# Patient Record
Sex: FEMALE | Race: BLACK OR AFRICAN AMERICAN | NOT HISPANIC OR LATINO
[De-identification: names, ages, dates, MRNs, and addresses within clinical notes are randomized per-mention and may not be internally consistent; named-entity substitution may affect disease eponyms.]

---

## 2017-04-14 PROBLEM — Z00.00 ENCOUNTER FOR PREVENTIVE HEALTH EXAMINATION: Status: ACTIVE | Noted: 2017-04-14

## 2017-04-17 ENCOUNTER — APPOINTMENT (OUTPATIENT)
Dept: OBGYN | Facility: CLINIC | Age: 57
End: 2017-04-17

## 2017-04-19 ENCOUNTER — OUTPATIENT (OUTPATIENT)
Dept: OUTPATIENT SERVICES | Facility: HOSPITAL | Age: 57
LOS: 1 days | Discharge: HOME | End: 2017-04-19

## 2017-05-15 ENCOUNTER — APPOINTMENT (OUTPATIENT)
Dept: OBGYN | Facility: CLINIC | Age: 57
End: 2017-05-15

## 2017-06-27 DIAGNOSIS — Z12.31 ENCOUNTER FOR SCREENING MAMMOGRAM FOR MALIGNANT NEOPLASM OF BREAST: ICD-10-CM

## 2017-09-11 ENCOUNTER — APPOINTMENT (OUTPATIENT)
Dept: OBGYN | Facility: CLINIC | Age: 57
End: 2017-09-11

## 2017-09-11 ENCOUNTER — RESULT CHARGE (OUTPATIENT)
Age: 57
End: 2017-09-11

## 2017-09-11 ENCOUNTER — OUTPATIENT (OUTPATIENT)
Dept: EMERGENCY DEPT | Facility: HOSPITAL | Age: 57
LOS: 1 days | Discharge: HOME | End: 2017-09-11

## 2017-09-11 VITALS
DIASTOLIC BLOOD PRESSURE: 60 MMHG | BODY MASS INDEX: 19.1 KG/M2 | WEIGHT: 126 LBS | SYSTOLIC BLOOD PRESSURE: 110 MMHG | HEIGHT: 68 IN

## 2017-09-11 DIAGNOSIS — F93.0 SEPARATION ANXIETY DISORDER OF CHILDHOOD: ICD-10-CM

## 2017-09-11 DIAGNOSIS — R87.619 UNSPECIFIED ABNORMAL CYTOLOGICAL FINDINGS IN SPECIMENS FROM CERVIX UTERI: ICD-10-CM

## 2017-09-11 DIAGNOSIS — A81.2 PROGRESSIVE MULTIFOCAL LEUKOENCEPHALOPATHY: ICD-10-CM

## 2017-09-11 DIAGNOSIS — B20 HUMAN IMMUNODEFICIENCY VIRUS [HIV] DISEASE: ICD-10-CM

## 2017-09-11 DIAGNOSIS — Z20.2 CONTACT WITH AND (SUSPECTED) EXPOSURE TO INFECTIONS WITH A PREDOMINANTLY SEXUAL MODE OF TRANSMISSION: ICD-10-CM

## 2017-09-11 DIAGNOSIS — B59 PNEUMOCYSTOSIS: ICD-10-CM

## 2017-09-11 LAB
HCG UR QL: NEGATIVE
QUALITY CONTROL: NO

## 2017-09-12 ENCOUNTER — RESULT REVIEW (OUTPATIENT)
Age: 57
End: 2017-09-12

## 2017-09-19 LAB
HPV I/H RISK 1 DNA CVX QL PROBE+SIG AMP: DETECTED
HPV16 DNA CVX QL PROBE+SIG AMP: DETECTED
HPV16 DNA CVX QL PROBE+SIG AMP: NOT DETECTED

## 2017-10-02 ENCOUNTER — APPOINTMENT (OUTPATIENT)
Dept: OBGYN | Facility: CLINIC | Age: 57
End: 2017-10-02

## 2017-11-20 ENCOUNTER — OUTPATIENT (OUTPATIENT)
Dept: OUTPATIENT SERVICES | Facility: HOSPITAL | Age: 57
LOS: 1 days | Discharge: HOME | End: 2017-11-20

## 2017-11-20 ENCOUNTER — APPOINTMENT (OUTPATIENT)
Dept: CARDIOLOGY | Facility: CLINIC | Age: 57
End: 2017-11-20

## 2017-11-20 VITALS
WEIGHT: 130 LBS | SYSTOLIC BLOOD PRESSURE: 139 MMHG | HEIGHT: 68 IN | DIASTOLIC BLOOD PRESSURE: 76 MMHG | BODY MASS INDEX: 19.7 KG/M2 | HEART RATE: 83 BPM

## 2017-11-20 DIAGNOSIS — B20 HUMAN IMMUNODEFICIENCY VIRUS [HIV] DISEASE: ICD-10-CM

## 2017-11-20 DIAGNOSIS — A81.2 PROGRESSIVE MULTIFOCAL LEUKOENCEPHALOPATHY: ICD-10-CM

## 2017-11-20 DIAGNOSIS — F93.0 SEPARATION ANXIETY DISORDER OF CHILDHOOD: ICD-10-CM

## 2017-11-20 DIAGNOSIS — B59 PNEUMOCYSTOSIS: ICD-10-CM

## 2017-11-20 DIAGNOSIS — R07.9 CHEST PAIN, UNSPECIFIED: ICD-10-CM

## 2018-01-08 ENCOUNTER — OUTPATIENT (OUTPATIENT)
Dept: OUTPATIENT SERVICES | Facility: HOSPITAL | Age: 58
LOS: 1 days | Discharge: HOME | End: 2018-01-08

## 2018-01-08 ENCOUNTER — APPOINTMENT (OUTPATIENT)
Dept: OBGYN | Facility: CLINIC | Age: 58
End: 2018-01-08

## 2018-01-08 ENCOUNTER — RESULT CHARGE (OUTPATIENT)
Age: 58
End: 2018-01-08

## 2018-01-08 VITALS
DIASTOLIC BLOOD PRESSURE: 60 MMHG | HEIGHT: 68 IN | BODY MASS INDEX: 19.7 KG/M2 | SYSTOLIC BLOOD PRESSURE: 130 MMHG | WEIGHT: 130 LBS

## 2018-01-08 DIAGNOSIS — F93.0 SEPARATION ANXIETY DISORDER OF CHILDHOOD: ICD-10-CM

## 2018-01-08 DIAGNOSIS — A81.2 PROGRESSIVE MULTIFOCAL LEUKOENCEPHALOPATHY: ICD-10-CM

## 2018-01-08 DIAGNOSIS — B59 PNEUMOCYSTOSIS: ICD-10-CM

## 2018-01-08 DIAGNOSIS — B20 HUMAN IMMUNODEFICIENCY VIRUS [HIV] DISEASE: ICD-10-CM

## 2018-01-08 LAB
HCG UR QL: NEGATIVE
QUALITY CONTROL: YES

## 2018-01-09 ENCOUNTER — RESULT REVIEW (OUTPATIENT)
Age: 58
End: 2018-01-09

## 2018-01-22 ENCOUNTER — APPOINTMENT (OUTPATIENT)
Dept: OBGYN | Facility: CLINIC | Age: 58
End: 2018-01-22

## 2018-01-22 ENCOUNTER — OUTPATIENT (OUTPATIENT)
Dept: OUTPATIENT SERVICES | Facility: HOSPITAL | Age: 58
LOS: 1 days | Discharge: HOME | End: 2018-01-22

## 2018-01-22 DIAGNOSIS — N76.0 ACUTE VAGINITIS: ICD-10-CM

## 2018-01-22 DIAGNOSIS — B96.89 ACUTE VAGINITIS: ICD-10-CM

## 2018-02-04 DIAGNOSIS — F93.0 SEPARATION ANXIETY DISORDER OF CHILDHOOD: ICD-10-CM

## 2018-02-04 DIAGNOSIS — B20 HUMAN IMMUNODEFICIENCY VIRUS [HIV] DISEASE: ICD-10-CM

## 2018-02-04 DIAGNOSIS — B59 PNEUMOCYSTOSIS: ICD-10-CM

## 2018-02-04 DIAGNOSIS — A81.2 PROGRESSIVE MULTIFOCAL LEUKOENCEPHALOPATHY: ICD-10-CM

## 2018-02-26 ENCOUNTER — EMERGENCY (EMERGENCY)
Facility: HOSPITAL | Age: 58
LOS: 0 days | Discharge: HOME | End: 2018-02-26
Attending: EMERGENCY MEDICINE

## 2018-02-26 VITALS
HEART RATE: 72 BPM | RESPIRATION RATE: 18 BRPM | TEMPERATURE: 98 F | OXYGEN SATURATION: 99 % | DIASTOLIC BLOOD PRESSURE: 63 MMHG | SYSTOLIC BLOOD PRESSURE: 130 MMHG

## 2018-02-26 DIAGNOSIS — Z86.73 PERSONAL HISTORY OF TRANSIENT ISCHEMIC ATTACK (TIA), AND CEREBRAL INFARCTION WITHOUT RESIDUAL DEFICITS: ICD-10-CM

## 2018-02-26 DIAGNOSIS — Z21 ASYMPTOMATIC HUMAN IMMUNODEFICIENCY VIRUS [HIV] INFECTION STATUS: ICD-10-CM

## 2018-02-26 DIAGNOSIS — R19.7 DIARRHEA, UNSPECIFIED: ICD-10-CM

## 2018-02-26 DIAGNOSIS — R19.15 OTHER ABNORMAL BOWEL SOUNDS: ICD-10-CM

## 2018-02-26 DIAGNOSIS — R10.9 UNSPECIFIED ABDOMINAL PAIN: ICD-10-CM

## 2018-02-26 NOTE — ED PROVIDER NOTE - NS ED ROS FT
Constitutional: No fever, chills.  Eyes: No visual changes, eye pain or discharge.  ENMT: No hearing changes, pain, discharge or infections.  Cardiac: No chest pain, SOB or edema.  Respiratory: No cough or respiratory distress.   : No dysuria, frequency or burning.  MS: No myalgia, muscle weakness, joint pain or back pain.  Neuro: No headache or weakness. No LOC.  Skin: No skin rash.   Except as documented in the HPI, all other systems are negative.

## 2018-02-26 NOTE — ED PROVIDER NOTE - PHYSICAL EXAMINATION
VSS, awake, alert, non-toxic appearing, lying comfortably in stretcher, in NAD, ears clear, no scleral icterus, oropharynx clear, mmm, no JVD or bruit, no jaundice, skin rash or lesions, chest CTAB, non-labored breathing, no w/r/r, +S1/S2, RRR, no m/r/g, abdomen soft, NT, ND, +BS, no scars, hernias or distention, no pulsatile masses or bruits appreciated, heme neg brown stool, no CVA tenderness, no peripheral edema or deformities, alert, clear speech and steady gait. VSS, awake, alert, non-toxic appearing, lying comfortably in stretcher, in NAD, ears clear, no scleral icterus, oropharynx clear, mmm, no JVD or bruit, no jaundice, skin rash or lesions, chest CTAB, non-labored breathing, no w/r/r, +S1/S2, RRR, no m/r/g, abdomen soft, NT, ND, +BS, no scars, hernias or distention, no pulsatile masses or bruits appreciated, no CVA tenderness, no peripheral edema or deformities, alert, clear speech and steady gait.

## 2018-02-26 NOTE — ED PROVIDER NOTE - OBJECTIVE STATEMENT
56 y/o female with an pmhx of HIVD follows up with Dr. Kitchen, reports undetectable counts, also reports hx of prior CVA with residual right sided weakness presents with epigastric “bubbling” x 3 days. Symptoms are intermittent, denies radiation, denies modifying factors. Reports several episodes of NB diarrhea daily. Not recently hospitalized, not institutionalized or in day program. Denies blood in stool, black stool, unintentional weight loss, abdominal pain, fever, headache, myalgias, n/v, recent antibiotic use, recent travel or sick contacts n/v, anorexia, urinary sx, vaginal d/c or other associated complaints at present.

## 2018-02-26 NOTE — ED PROVIDER NOTE - PROGRESS NOTE DETAILS
The patient was advised to return to the emergency department if any new symptoms developed, symptoms worsened or for any concerns. The patient was offered the opportunity to ask questions and verbalized that they understand the diagnosis and discharge instructions. Impression: pt has non-infectious diarrhea, no indication of ABX at this time. Pt is well appearing, supportive care measure, outpatient f/u. The patient was advised to return to the emergency department if any new symptoms developed, symptoms worsened or for any concerns. The patient was offered the opportunity to ask questions and verbalized that they understand the diagnosis and discharge instructions. 3.709 3.65

## 2018-03-05 ENCOUNTER — OUTPATIENT (OUTPATIENT)
Dept: OUTPATIENT SERVICES | Facility: HOSPITAL | Age: 58
LOS: 1 days | Discharge: HOME | End: 2018-03-05

## 2018-03-05 ENCOUNTER — APPOINTMENT (OUTPATIENT)
Dept: OBGYN | Facility: CLINIC | Age: 58
End: 2018-03-05

## 2018-03-05 ENCOUNTER — RESULT CHARGE (OUTPATIENT)
Age: 58
End: 2018-03-05

## 2018-03-05 ENCOUNTER — LABORATORY RESULT (OUTPATIENT)
Age: 58
End: 2018-03-05

## 2018-03-05 DIAGNOSIS — Z98.890 OTHER SPECIFIED POSTPROCEDURAL STATES: ICD-10-CM

## 2018-03-05 DIAGNOSIS — N87.1 MODERATE CERVICAL DYSPLASIA: ICD-10-CM

## 2018-03-07 LAB
HCG UR QL: NEGATIVE
QUALITY CONTROL: YES

## 2018-03-14 ENCOUNTER — OTHER (OUTPATIENT)
Age: 58
End: 2018-03-14

## 2018-04-16 ENCOUNTER — APPOINTMENT (OUTPATIENT)
Dept: OBGYN | Facility: CLINIC | Age: 58
End: 2018-04-16

## 2018-04-20 ENCOUNTER — EMERGENCY (EMERGENCY)
Facility: HOSPITAL | Age: 58
LOS: 0 days | Discharge: HOME | End: 2018-04-20
Admitting: PHYSICIAN ASSISTANT

## 2018-04-20 VITALS
DIASTOLIC BLOOD PRESSURE: 63 MMHG | RESPIRATION RATE: 18 BRPM | TEMPERATURE: 97 F | SYSTOLIC BLOOD PRESSURE: 114 MMHG | OXYGEN SATURATION: 100 % | HEART RATE: 96 BPM

## 2018-04-20 DIAGNOSIS — Z86.73 PERSONAL HISTORY OF TRANSIENT ISCHEMIC ATTACK (TIA), AND CEREBRAL INFARCTION WITHOUT RESIDUAL DEFICITS: ICD-10-CM

## 2018-04-20 DIAGNOSIS — Z88.2 ALLERGY STATUS TO SULFONAMIDES: ICD-10-CM

## 2018-04-20 DIAGNOSIS — H10.45 OTHER CHRONIC ALLERGIC CONJUNCTIVITIS: ICD-10-CM

## 2018-04-20 DIAGNOSIS — H57.8 OTHER SPECIFIED DISORDERS OF EYE AND ADNEXA: ICD-10-CM

## 2018-04-20 RX ORDER — OLOPATADINE HYDROCHLORIDE 1 MG/ML
1 SOLUTION/ DROPS OPHTHALMIC
Qty: 5 | Refills: 0
Start: 2018-04-20 | End: 2018-04-26

## 2018-04-20 NOTE — ED PROVIDER NOTE - PHYSICAL EXAMINATION
CONST: Well appearing in NAD  EYES: PERRL, EOMI, Sclera and conjunctiva clear. No discharge. No swelling. VA 20/20 OU,OS, OD. IOP OS is 11   ENT: No nasal discharge. TM's clear B/L without drainage. Oropharynx normal appearing, no erythema or exudates. Uvula midline.  NECK: Non-tender, no meningeal signs  CARD: Normal S1 S2; Normal rate and rhythm  RESP: Equal BS B/L, No wheezes, rhonchi or rales. No distress  GI: Soft, non-tender, non-distended.  MS: Normal ROM in all extremities. No midline spinal tenderness.  SKIN: Warm, dry, no acute rashes. Good turgor  NEURO: A&Ox3, No focal deficits. Strength 5/5 with no sensory deficits. Steady gait. Speech normal. No facial droop

## 2018-04-20 NOTE — ED PROVIDER NOTE - NS ED ROS FT
CONST: No fever, chills or bodyaches  ENT: No ear pain or discharge, nasal discharge or congestion. No sore throat  CARD: No chest pain, palpitations  RESP: No SOB, cough, hemoptysis. No hx of asthma or COPD  GI: No abdominal pain, N/V/D  MS: No joint pain, back pain or extremity pain/injury  SKIN: No rashes  NEURO: No headache, dizziness, paresthesias or LOC, or weakness

## 2018-04-20 NOTE — ED PROVIDER NOTE - OBJECTIVE STATEMENT
itchy eyes bilaterally x 2 days assoc with redness. Denies discharge, vision change, eye pain, HA, NV

## 2018-04-23 ENCOUNTER — APPOINTMENT (OUTPATIENT)
Dept: OBGYN | Facility: CLINIC | Age: 58
End: 2018-04-23

## 2018-05-16 ENCOUNTER — APPOINTMENT (OUTPATIENT)
Dept: PAIN MANAGEMENT | Facility: CLINIC | Age: 58
End: 2018-05-16

## 2018-05-16 ENCOUNTER — OUTPATIENT (OUTPATIENT)
Dept: OUTPATIENT SERVICES | Facility: HOSPITAL | Age: 58
LOS: 1 days | Discharge: HOME | End: 2018-05-16

## 2018-05-16 VITALS
RESPIRATION RATE: 16 BRPM | HEART RATE: 77 BPM | SYSTOLIC BLOOD PRESSURE: 120 MMHG | OXYGEN SATURATION: 100 % | DIASTOLIC BLOOD PRESSURE: 72 MMHG | HEIGHT: 68 IN | WEIGHT: 130 LBS | BODY MASS INDEX: 19.7 KG/M2

## 2018-06-04 ENCOUNTER — APPOINTMENT (OUTPATIENT)
Dept: OBGYN | Facility: CLINIC | Age: 58
End: 2018-06-04

## 2018-07-05 ENCOUNTER — APPOINTMENT (OUTPATIENT)
Dept: OTOLARYNGOLOGY | Facility: CLINIC | Age: 58
End: 2018-07-05
Payer: MEDICAID

## 2018-07-05 VITALS — WEIGHT: 130 LBS | BODY MASS INDEX: 19.7 KG/M2 | HEIGHT: 68 IN

## 2018-07-05 DIAGNOSIS — H61.23 IMPACTED CERUMEN, BILATERAL: ICD-10-CM

## 2018-07-05 PROCEDURE — 69210 REMOVE IMPACTED EAR WAX UNI: CPT

## 2018-07-05 PROCEDURE — 99202 OFFICE O/P NEW SF 15 MIN: CPT | Mod: 25

## 2018-07-20 ENCOUNTER — APPOINTMENT (OUTPATIENT)
Dept: SURGERY | Facility: CLINIC | Age: 58
End: 2018-07-20

## 2018-08-24 ENCOUNTER — APPOINTMENT (OUTPATIENT)
Dept: GASTROENTEROLOGY | Facility: CLINIC | Age: 58
End: 2018-08-24

## 2019-08-01 ENCOUNTER — APPOINTMENT (OUTPATIENT)
Dept: SURGERY | Facility: CLINIC | Age: 59
End: 2019-08-01

## 2019-08-14 ENCOUNTER — OUTPATIENT (OUTPATIENT)
Dept: OUTPATIENT SERVICES | Facility: HOSPITAL | Age: 59
LOS: 1 days | Discharge: HOME | End: 2019-08-14

## 2019-08-14 DIAGNOSIS — Z21 ASYMPTOMATIC HUMAN IMMUNODEFICIENCY VIRUS [HIV] INFECTION STATUS: ICD-10-CM

## 2019-09-13 ENCOUNTER — APPOINTMENT (OUTPATIENT)
Dept: SURGERY | Facility: CLINIC | Age: 59
End: 2019-09-13
Payer: MEDICAID

## 2019-09-13 VITALS
SYSTOLIC BLOOD PRESSURE: 138 MMHG | DIASTOLIC BLOOD PRESSURE: 82 MMHG | HEIGHT: 68 IN | WEIGHT: 143 LBS | BODY MASS INDEX: 21.67 KG/M2

## 2019-09-13 DIAGNOSIS — Z86.73 PERSONAL HISTORY OF TRANSIENT ISCHEMIC ATTACK (TIA), AND CEREBRAL INFARCTION W/OUT RESIDUAL DEFICITS: ICD-10-CM

## 2019-09-13 DIAGNOSIS — Z82.49 FAMILY HISTORY OF ISCHEMIC HEART DISEASE AND OTHER DISEASES OF THE CIRCULATORY SYSTEM: ICD-10-CM

## 2019-09-13 PROCEDURE — 99212 OFFICE O/P EST SF 10 MIN: CPT

## 2019-09-16 PROBLEM — Z82.49 FAMILY HISTORY OF HYPERTENSION: Status: ACTIVE | Noted: 2017-09-11

## 2019-09-16 PROBLEM — Z86.73 HISTORY OF STROKE: Status: RESOLVED | Noted: 2017-09-11 | Resolved: 2019-09-16

## 2019-09-16 RX ORDER — METRONIDAZOLE 500 MG/1
500 TABLET ORAL
Qty: 10 | Refills: 0 | Status: COMPLETED | COMMUNITY
Start: 2018-01-22 | End: 2019-09-16

## 2019-09-17 NOTE — ASSESSMENT
[FreeTextEntry1] : 58yo female with mutliple medical problems including CVA and HIV referred for right shoulder mass - likely lipoma. \par -risks, benefits, and alteratives were explained to the patient - including risk of hematoma, seroma, deformity and infection\par -all questions were answered\par -consent was obtained for EXCISION OF RIGHT SHOULDER MASS\par -will require PAT's\par -plan for excision under local with sedation\par -return to office post-operatively\par

## 2019-09-17 NOTE — REASON FOR VISIT
[Consultation] : a consultation visit [Formal Caregiver] : formal caregiver [FreeTextEntry1] : right shoulder mass

## 2019-09-17 NOTE — PHYSICAL EXAM
[Normal Breath Sounds] : Normal breath sounds [No Rash or Lesion] : No rash or lesion [Normal Heart Sounds] : normal heart sounds [Calm] : calm [de-identified] : no acute distress [de-identified] : soft, no hernias, no masses [de-identified] : full ROM x 4, 4x4cm soft tissue mass right shoulder [de-identified] : no gross abnormalities

## 2019-09-17 NOTE — CONSULT LETTER
[Please see my note below.] : Please see my note below. [Sincerely,] : Sincerely, [Dear  ___] : Dear  [unfilled], [FreeTextEntry3] : Lindsey Crowder MD\par Bariatric & Minimally Invasive Surgery\par [facility]\par [phone]\par

## 2019-09-17 NOTE — HISTORY OF PRESENT ILLNESS
[de-identified] : 58yo female with PMHx of CVA 10 years ago, HIV (dx 32 years ago, pending CD4), HTN, chronic pain on Oxycodone referred for evaluation of right shoulder mass. Patient reports noticing this shoulder mass several years ago, but it has increased in size over time. Denies neurologic deficits, drainage, or changes in skin. It is uncomfortable as it is where her brastrap sits on her shoulder. She states that she has another "bump" on her left chest but it does not bother her.

## 2019-09-23 ENCOUNTER — APPOINTMENT (OUTPATIENT)
Dept: GASTROENTEROLOGY | Facility: CLINIC | Age: 59
End: 2019-09-23

## 2019-09-24 ENCOUNTER — OUTPATIENT (OUTPATIENT)
Dept: OUTPATIENT SERVICES | Facility: HOSPITAL | Age: 59
LOS: 1 days | Discharge: HOME | End: 2019-09-24
Payer: COMMERCIAL

## 2019-09-24 DIAGNOSIS — M25.511 PAIN IN RIGHT SHOULDER: ICD-10-CM

## 2019-09-24 DIAGNOSIS — M25.551 PAIN IN RIGHT HIP: ICD-10-CM

## 2019-09-24 PROCEDURE — 73522 X-RAY EXAM HIPS BI 3-4 VIEWS: CPT | Mod: 26

## 2019-09-24 PROCEDURE — 73030 X-RAY EXAM OF SHOULDER: CPT | Mod: 26,50

## 2019-10-30 ENCOUNTER — APPOINTMENT (OUTPATIENT)
Dept: SURGERY | Facility: CLINIC | Age: 59
End: 2019-10-30
Payer: COMMERCIAL

## 2019-10-30 VITALS
HEIGHT: 68 IN | SYSTOLIC BLOOD PRESSURE: 138 MMHG | DIASTOLIC BLOOD PRESSURE: 82 MMHG | WEIGHT: 141 LBS | BODY MASS INDEX: 21.37 KG/M2

## 2019-10-30 PROCEDURE — 99212 OFFICE O/P EST SF 10 MIN: CPT

## 2019-10-31 NOTE — PHYSICAL EXAM
[Normal Breath Sounds] : Normal breath sounds [Normal Heart Sounds] : normal heart sounds [No Rash or Lesion] : No rash or lesion [Calm] : calm [de-identified] : no acute distress [de-identified] : soft, no hernias, no masses [de-identified] : full ROM x 4, [id number] soft tissue mass right shoulder [de-identified] : no gross abnormalities

## 2019-10-31 NOTE — ASSESSMENT
[FreeTextEntry1] : 60yo female with mutliple medical problems including CVA and HIV referred for right shoulder mass - likely lipoma. \par -risks, benefits, and alteratives were explained to the patient - including risk of hematoma, seroma, scar, deformity and infection\par -all questions were answered\par -consent was obtained at last visit for EXCISION OF RIGHT SHOULDER MASS\par -cleared by PCP as per patient - will contact PCP for clearances\par -plan for excision under local with sedation\par -return to office post-operatively

## 2019-10-31 NOTE — HISTORY OF PRESENT ILLNESS
[de-identified] : 60yo female with PMHx of CVA 10 years ago, HIV (dx 32 years ago, pending CD4), HTN, chronic pain on Oxycodone previously seen in office for right shoulder mass evaluation. Patient reports that the mass continues to bother her. No new symptoms or change in lesion. She was seen by her PCP and obtained clearance for surgery and feels ready to schedule her operation.

## 2019-11-10 ENCOUNTER — FORM ENCOUNTER (OUTPATIENT)
Age: 59
End: 2019-11-10

## 2019-11-11 ENCOUNTER — OUTPATIENT (OUTPATIENT)
Dept: OUTPATIENT SERVICES | Facility: HOSPITAL | Age: 59
LOS: 1 days | Discharge: HOME | End: 2019-11-11
Payer: COMMERCIAL

## 2019-11-11 VITALS
HEIGHT: 66 IN | DIASTOLIC BLOOD PRESSURE: 78 MMHG | TEMPERATURE: 97 F | OXYGEN SATURATION: 99 % | HEART RATE: 69 BPM | RESPIRATION RATE: 17 BRPM | WEIGHT: 143.3 LBS | SYSTOLIC BLOOD PRESSURE: 130 MMHG

## 2019-11-11 DIAGNOSIS — R22.30 LOCALIZED SWELLING, MASS AND LUMP, UNSPECIFIED UPPER LIMB: ICD-10-CM

## 2019-11-11 DIAGNOSIS — Z01.818 ENCOUNTER FOR OTHER PREPROCEDURAL EXAMINATION: ICD-10-CM

## 2019-11-11 PROCEDURE — 93010 ELECTROCARDIOGRAM REPORT: CPT

## 2019-11-11 PROCEDURE — 71046 X-RAY EXAM CHEST 2 VIEWS: CPT | Mod: 26

## 2019-11-11 NOTE — H&P PST ADULT - REASON FOR ADMISSION
58 Y/O FEMALE HERE FOR PRE-ADMISSION SURGICAL TESTING. PATIENT REPORTS SHE HAS A MASS TO RIGHT SHOULDER FOR SEVERAL YRS, GETTING BIGGER AND CAUSING PAIN.  NOW FOR SCHEDULED PROCEDURE.

## 2019-11-11 NOTE — H&P PST ADULT - NSICDXPASTMEDICALHX_GEN_ALL_CORE_FT
PAST MEDICAL HISTORY:  COPD (chronic obstructive pulmonary disease)     CVA (cerebral vascular accident) 1997 RIGHT SIDED WEAKNESS    HIV (human immunodeficiency virus infection) DX 1997

## 2019-11-11 NOTE — H&P PST ADULT - NSANTHOSAYNRD_GEN_A_CORE
No. MIA screening performed.  STOP BANG Legend: 0-2 = LOW Risk; 3-4 = INTERMEDIATE Risk; 5-8 = HIGH Risk

## 2019-11-11 NOTE — H&P PST ADULT - HISTORY OF PRESENT ILLNESS
PATIENT DENIES CHEST PAIN, SHORTNESS OF BREATH, PALPITATIONS, COUGHING, FEVER, DYSURIA.  CAN WALK UP 2-3 FLIGHTS OF STEPS WITHOUT SOB.    **PATIENT HAS BLISTERY RASH NOTED TO RIGHT SIDE OF ABDOMEN ALONG DERMATONE LEVEL, CAUSING PAIN AND ITCHINESS.

## 2020-01-13 PROBLEM — J44.9 CHRONIC OBSTRUCTIVE PULMONARY DISEASE, UNSPECIFIED: Chronic | Status: ACTIVE | Noted: 2019-11-11

## 2020-01-13 PROBLEM — I63.9 CEREBRAL INFARCTION, UNSPECIFIED: Chronic | Status: ACTIVE | Noted: 2018-02-26

## 2020-01-20 ENCOUNTER — OUTPATIENT (OUTPATIENT)
Dept: OUTPATIENT SERVICES | Facility: HOSPITAL | Age: 60
LOS: 1 days | Discharge: HOME | End: 2020-01-20

## 2020-01-20 VITALS
HEIGHT: 65 IN | TEMPERATURE: 99 F | OXYGEN SATURATION: 100 % | WEIGHT: 138.89 LBS | RESPIRATION RATE: 16 BRPM | HEART RATE: 80 BPM | SYSTOLIC BLOOD PRESSURE: 147 MMHG | DIASTOLIC BLOOD PRESSURE: 75 MMHG

## 2020-01-20 DIAGNOSIS — Z01.818 ENCOUNTER FOR OTHER PREPROCEDURAL EXAMINATION: ICD-10-CM

## 2020-01-20 DIAGNOSIS — R22.30 LOCALIZED SWELLING, MASS AND LUMP, UNSPECIFIED UPPER LIMB: ICD-10-CM

## 2020-01-20 LAB
ALBUMIN SERPL ELPH-MCNC: 4.2 G/DL — SIGNIFICANT CHANGE UP (ref 3.5–5.2)
ALP SERPL-CCNC: 158 U/L — HIGH (ref 30–115)
ALT FLD-CCNC: 12 U/L — SIGNIFICANT CHANGE UP (ref 0–41)
ANION GAP SERPL CALC-SCNC: 13 MMOL/L — SIGNIFICANT CHANGE UP (ref 7–14)
APTT BLD: 34.7 SEC — SIGNIFICANT CHANGE UP (ref 27–39.2)
AST SERPL-CCNC: 24 U/L — SIGNIFICANT CHANGE UP (ref 0–41)
BASOPHILS # BLD AUTO: 0.01 K/UL — SIGNIFICANT CHANGE UP (ref 0–0.2)
BASOPHILS NFR BLD AUTO: 0.4 % — SIGNIFICANT CHANGE UP (ref 0–1)
BILIRUB SERPL-MCNC: 0.3 MG/DL — SIGNIFICANT CHANGE UP (ref 0.2–1.2)
BUN SERPL-MCNC: 11 MG/DL — SIGNIFICANT CHANGE UP (ref 10–20)
CALCIUM SERPL-MCNC: 9 MG/DL — SIGNIFICANT CHANGE UP (ref 8.5–10.1)
CHLORIDE SERPL-SCNC: 105 MMOL/L — SIGNIFICANT CHANGE UP (ref 98–110)
CO2 SERPL-SCNC: 23 MMOL/L — SIGNIFICANT CHANGE UP (ref 17–32)
CREAT SERPL-MCNC: 1 MG/DL — SIGNIFICANT CHANGE UP (ref 0.7–1.5)
EOSINOPHIL # BLD AUTO: 0.01 K/UL — SIGNIFICANT CHANGE UP (ref 0–0.7)
EOSINOPHIL NFR BLD AUTO: 0.4 % — SIGNIFICANT CHANGE UP (ref 0–8)
GLUCOSE SERPL-MCNC: 126 MG/DL — HIGH (ref 70–99)
HCT VFR BLD CALC: 40.4 % — SIGNIFICANT CHANGE UP (ref 37–47)
HGB BLD-MCNC: 13 G/DL — SIGNIFICANT CHANGE UP (ref 12–16)
IMM GRANULOCYTES NFR BLD AUTO: 0.4 % — HIGH (ref 0.1–0.3)
INR BLD: 1.03 RATIO — SIGNIFICANT CHANGE UP (ref 0.65–1.3)
LYMPHOCYTES # BLD AUTO: 1.1 K/UL — LOW (ref 1.2–3.4)
LYMPHOCYTES # BLD AUTO: 39 % — SIGNIFICANT CHANGE UP (ref 20.5–51.1)
MCHC RBC-ENTMCNC: 30.6 PG — SIGNIFICANT CHANGE UP (ref 27–31)
MCHC RBC-ENTMCNC: 32.2 G/DL — SIGNIFICANT CHANGE UP (ref 32–37)
MCV RBC AUTO: 95.1 FL — SIGNIFICANT CHANGE UP (ref 81–99)
MONOCYTES # BLD AUTO: 0.29 K/UL — SIGNIFICANT CHANGE UP (ref 0.1–0.6)
MONOCYTES NFR BLD AUTO: 10.3 % — HIGH (ref 1.7–9.3)
NEUTROPHILS # BLD AUTO: 1.4 K/UL — SIGNIFICANT CHANGE UP (ref 1.4–6.5)
NEUTROPHILS NFR BLD AUTO: 49.5 % — SIGNIFICANT CHANGE UP (ref 42.2–75.2)
NRBC # BLD: 0 /100 WBCS — SIGNIFICANT CHANGE UP (ref 0–0)
PLATELET # BLD AUTO: 184 K/UL — SIGNIFICANT CHANGE UP (ref 130–400)
POTASSIUM SERPL-MCNC: 4.6 MMOL/L — SIGNIFICANT CHANGE UP (ref 3.5–5)
POTASSIUM SERPL-SCNC: 4.6 MMOL/L — SIGNIFICANT CHANGE UP (ref 3.5–5)
PROT SERPL-MCNC: 7.6 G/DL — SIGNIFICANT CHANGE UP (ref 6–8)
PROTHROM AB SERPL-ACNC: 11.8 SEC — SIGNIFICANT CHANGE UP (ref 9.95–12.87)
RBC # BLD: 4.25 M/UL — SIGNIFICANT CHANGE UP (ref 4.2–5.4)
RBC # FLD: 14.3 % — SIGNIFICANT CHANGE UP (ref 11.5–14.5)
SODIUM SERPL-SCNC: 141 MMOL/L — SIGNIFICANT CHANGE UP (ref 135–146)
WBC # BLD: 2.82 K/UL — LOW (ref 4.8–10.8)
WBC # FLD AUTO: 2.82 K/UL — LOW (ref 4.8–10.8)

## 2020-01-20 NOTE — H&P PST ADULT - REASON FOR ADMISSION
58 yo female presents with  "cyst on my right shoulder& I have to get it removed, it is getting bigger";  denies chest pain, palpitations, shortness of breath, dyspnea, or dysuria. exercise tolerance: 2 blocks/ flights of stairs w/o sob

## 2020-01-20 NOTE — H&P PST ADULT - NSICDXPASTMEDICALHX_GEN_ALL_CORE_FT
PAST MEDICAL HISTORY:  COPD (chronic obstructive pulmonary disease)     CVA (cerebral vascular accident) 1997 RIGHT SIDED WEAKNESS    HIV (human immunodeficiency virus infection) DX 1997    HTN (hypertension)

## 2020-01-27 PROBLEM — I10 ESSENTIAL (PRIMARY) HYPERTENSION: Chronic | Status: ACTIVE | Noted: 2020-01-20

## 2020-01-29 NOTE — ASU PATIENT PROFILE, ADULT - PMH
COPD (chronic obstructive pulmonary disease)    CVA (cerebral vascular accident)  1997 RIGHT SIDED WEAKNESS  HIV (human immunodeficiency virus infection)  DX 1997  HTN (hypertension)

## 2020-01-30 ENCOUNTER — APPOINTMENT (OUTPATIENT)
Dept: SURGERY | Facility: AMBULATORY SURGERY CENTER | Age: 60
End: 2020-01-30

## 2020-01-30 ENCOUNTER — OUTPATIENT (OUTPATIENT)
Dept: OUTPATIENT SERVICES | Facility: HOSPITAL | Age: 60
LOS: 1 days | Discharge: HOME | End: 2020-01-30
Payer: COMMERCIAL

## 2020-01-30 ENCOUNTER — RESULT REVIEW (OUTPATIENT)
Age: 60
End: 2020-01-30

## 2020-01-30 VITALS
OXYGEN SATURATION: 100 % | DIASTOLIC BLOOD PRESSURE: 70 MMHG | SYSTOLIC BLOOD PRESSURE: 168 MMHG | RESPIRATION RATE: 18 BRPM | HEART RATE: 76 BPM

## 2020-01-30 VITALS
HEIGHT: 65 IN | HEART RATE: 69 BPM | SYSTOLIC BLOOD PRESSURE: 146 MMHG | WEIGHT: 138.89 LBS | OXYGEN SATURATION: 100 % | RESPIRATION RATE: 18 BRPM | TEMPERATURE: 98 F | DIASTOLIC BLOOD PRESSURE: 68 MMHG

## 2020-01-30 PROCEDURE — 23071 EXC SHOULDER LES SC 3 CM/>: CPT

## 2020-01-30 PROCEDURE — 88304 TISSUE EXAM BY PATHOLOGIST: CPT | Mod: 26

## 2020-01-30 RX ORDER — HYDROMORPHONE HYDROCHLORIDE 2 MG/ML
0.5 INJECTION INTRAMUSCULAR; INTRAVENOUS; SUBCUTANEOUS
Refills: 0 | Status: DISCONTINUED | OUTPATIENT
Start: 2020-01-30 | End: 2020-01-30

## 2020-01-30 RX ORDER — SODIUM CHLORIDE 9 MG/ML
1000 INJECTION, SOLUTION INTRAVENOUS
Refills: 0 | Status: DISCONTINUED | OUTPATIENT
Start: 2020-01-30 | End: 2020-02-17

## 2020-01-30 RX ADMIN — SODIUM CHLORIDE 100 MILLILITER(S): 9 INJECTION, SOLUTION INTRAVENOUS at 14:19

## 2020-01-30 NOTE — ASU DISCHARGE PLAN (ADULT/PEDIATRIC) - CARE PROVIDER_API CALL
Lindsey Crowder)  [facility]  [address] [place] [address] [address] [place] [address]  Phone: [phone]  Fax: [phone]  Established Patient  Follow Up Time: 1 week

## 2020-01-30 NOTE — ASU DISCHARGE PLAN (ADULT/PEDIATRIC) - ACTIVITY LEVEL
No excercise/No heavy lifting/No reaching, grabbing, or pulling with the Right arm, no heavy lifting, no strenuous physical activity with right arm, do not sleep on your right side, please keep the head of your bed elevated with pillows/No sports/gym

## 2020-01-30 NOTE — ASU PREOP CHECKLIST - PATIENT SENT TO
Verified Results  COMP METABOLIC PANEL WITH LIPID PANEL (CPNL,LIPDPL) [id number] 08:36AM DOLLY REED     Test Name Result Flag Reference   SODIUM 138 mmol/L  135-145   POTASSIUM 4.2 mmol/L  3.4-5.1   CHLORIDE 102 mmol/L     CARBON DIOXIDE 28 mmol/L  21-32   ANION GAP 12 mmol/L  10-20   GLUCOSE 182 mg/dl H 65-99   BUN 21 mg/dl H 6-20   CREATININE 0.97 mg/dl  0.67-1.17   GFR EST.AFRICAN AMER >90     eGFR results = or >90 mL/min/1.73m2 = Normal kidney function.   GFR EST.NONAFRI AMER 80     eGFR 60 - 89 mL/min/1.73m2 = Mild decrease in kidney function.   BUN/CREATININE RATIO 22  7-25   BILIRUBIN TOTAL 0.3 mg/dl  0.2-1.0   GOT/AST 40 Units/L H <38   ALKALINE PHOSPHATASE 150 Units/L H    ALBUMIN 3.6 g/dl  3.6-5.1   TOTAL PROTEIN 7.6 g/dl  6.4-8.2   GLOBULIN (CALCULATED) 4.0 g/dl  2.0-4.0   A/G RATIO 0.9 L 1.0-2.4   CALCIUM 9.2 mg/dl  8.4-10.2   GPT/ALT 66 Units/L  <79   FASTING STATUS 12 hrs     CHOLESTEROL 154 mg/dl  <200   Desirable            <200  Borderline High      200 to 239  High                 >=240   HDL CHOLESTEROL 37 mg/dl L >39   Low            <40  Borderline Low 40 to 49  Near Optimal   50 to 59  Optimal        >=60   TRIGLYCERIDES 233 mg/dl H <150   Normal                   <150  Borderline High          150 to 199  High                     200 to 499  Very High                >=500   LDL CHOLESTEROL (CALCULATED) 70 mg/dl  <130   OPTIMAL               <100  NEAR OPTIMAL          100-129  BORDERLINE HIGH       130-159  HIGH                  160-189  VERY HIGH             >=190   NON-HDL CHOLESTEROL 117 mg/dl     Therapeutic Target:  CHD and risk equivalents <130  Multiple risk factors    <160  0 to 1 risk factors      <190   CHOLESTEROL/HDL RATIO 4.2  <4.5   FASTING STATUS 12 hrs       HEMOGLOBIN A1C GLYCOSYLATED [id number] 08:36AM BRIANNADOLLY CUENCA     Test Name Result Flag Reference   HEMOGLOBIN A1C GLYH 9.5 % H 4.5-5.6   ----DIABETIC SCREENING---  NON DIABETIC                  <5.7%  INCREASED RISK                5.7-6.4%  DIAGNOSTIC FOR DIABETES      >6.4%     ----DIABETIC CONTROL---     A1C%           eAG mg/dL  6.0            126  6.5            140  7.0            154  7.5            169  8.0            183  8.5            197  9.0            212  9.5            226  10.0           240        operating room

## 2020-01-30 NOTE — ASU DISCHARGE PLAN (ADULT/PEDIATRIC) - BATHING
Shower only/You may shower tonight, your dressing is waterproof, avoid direct exposure to water stream, do not submerge in water, blot dry

## 2020-01-30 NOTE — CHART NOTE - NSCHARTNOTEFT_GEN_A_CORE
PACU ANESTHESIA ADMISSION NOTE      Procedure: Excision of soft tissue mass of back: Right shoulder    Post op diagnosis:  Lipoma of right shoulder      ____  Intubated  TV:______       Rate: ______      FiO2: ______    _x___  Patent Airway    _x___  Full return of protective reflexes    __x__  Full recovery from anesthesia / back to baseline     Vitals:   T:  97.6F         R:       12           BP:   180/79               Sat:        99           P: 72      Mental Status:  __x__ Awake   ___x__ Alert   _____ Drowsy   _____ Sedated    Nausea/Vomiting:  __x__ NO  ______Yes,   See Post - Op Orders          Pain Scale (0-10):  _0/10____    Treatment: ____ None    __x__ See Post - Op/PCA Orders    Post - Operative Fluids:   ____ Oral   __x__ See Post - Op Orders    Plan: Discharge:   _x___Home       _____Floor     _____Critical Care    _____  Other:_________________    Comments: pt tolerated procedure well, no anesthesia related complications. Care of pt endorsed to PACU, report given to PACU RN.

## 2020-01-30 NOTE — ASU DISCHARGE PLAN (ADULT/PEDIATRIC) - ASU DC SPECIAL INSTRUCTIONSFT
Patient Name: TARA GOODMAN  MRN: [id number]  59y Female  [facility]    Post Operative Instructions  Please see wound care and activity instructions as documented above.    Pain medication prescribed:   Percocet 5/325 oral tablet: 1 tab(s) orally every 6 hours, As Needed MDD:4 tablets      - Please take pain medications prescribed only as needed for severe pain, otherwise you may take Tylenol, 650mg every 6 hours as needed and/or Motrin, 600mg every 6 hours as needed for mild pain control    Additional Instructions:  Please call the clinic and confirm your appointment for follow up, see the follow up instructions and the physician's office number  below. Please call the clinic for any questions or concerns.    01-30-20 @ 14:07

## 2020-01-30 NOTE — ASU DISCHARGE PLAN (ADULT/PEDIATRIC) - FOLLOW UP APPOINTMENTS
[facility]:  Endoscopy/Ambulatory Surgery [place]... [facility]:  Center for Ambulatory Surgery…

## 2020-02-03 LAB — SURGICAL PATHOLOGY STUDY: SIGNIFICANT CHANGE UP

## 2020-02-04 DIAGNOSIS — Z21 ASYMPTOMATIC HUMAN IMMUNODEFICIENCY VIRUS [HIV] INFECTION STATUS: ICD-10-CM

## 2020-02-04 DIAGNOSIS — F17.210 NICOTINE DEPENDENCE, CIGARETTES, UNCOMPLICATED: ICD-10-CM

## 2020-02-04 DIAGNOSIS — Z86.73 PERSONAL HISTORY OF TRANSIENT ISCHEMIC ATTACK (TIA), AND CEREBRAL INFARCTION WITHOUT RESIDUAL DEFICITS: ICD-10-CM

## 2020-02-04 DIAGNOSIS — D17.21 BENIGN LIPOMATOUS NEOPLASM OF SKIN AND SUBCUTANEOUS TISSUE OF RIGHT ARM: ICD-10-CM

## 2020-02-14 ENCOUNTER — APPOINTMENT (OUTPATIENT)
Dept: SURGERY | Facility: CLINIC | Age: 60
End: 2020-02-14
Payer: COMMERCIAL

## 2020-02-14 VITALS
SYSTOLIC BLOOD PRESSURE: 122 MMHG | DIASTOLIC BLOOD PRESSURE: 78 MMHG | BODY MASS INDEX: 20.92 KG/M2 | HEIGHT: 68 IN | TEMPERATURE: 99.2 F | WEIGHT: 138 LBS | HEART RATE: 90 BPM

## 2020-02-14 DIAGNOSIS — R22.30 LOCALIZED SWELLING, MASS AND LUMP, UNSPECIFIED UPPER LIMB: ICD-10-CM

## 2020-02-14 PROCEDURE — 99024 POSTOP FOLLOW-UP VISIT: CPT

## 2020-02-14 NOTE — ASSESSMENT
[FreeTextEntry1] : 60yo female with mutliple medical problems including CVA and HIV referred for right shoulder mass s/p right shoulder mass excision 1/30/2020. Doing well. Now complains of left lower neck mass. \par -pathology given for records - lipoma\par -MRI of neck ordered for evaluation of lesion - r/o invasion or deeper involvement\par -return to office after obtaining MRI - sooner if fever or drainage from right shoulder incision

## 2020-02-14 NOTE — PHYSICAL EXAM
[Normal Breath Sounds] : Normal breath sounds [Normal Heart Sounds] : normal heart sounds [Alert] : alert [de-identified] : no acute distress [Calm] : calm [de-identified] : lower neck lesion 3x2cm, fixed overlying clavicle, soft, no LAD, full ROM, no neurosensory deficit [de-identified] : full ROM x 4 [de-identified] : right shoulder incision well-healing without expressible drainage, no erythema or induration

## 2020-02-14 NOTE — HISTORY OF PRESENT ILLNESS
[de-identified] : 58yo female with PMHx of CVA 10 years ago, HIV (dx 32 years ago), HTN, chronic pain on Oxycodone previously seen in office for right shoulder mass evaluation s/p right shoulder mass excision 1/30/2020. At this time, patient reports she is feeling well. She does not have pain or fever. There is no swelling in the area nor drainage from the site. No change in motor function or sensation of the extremity. She now complains of left clavicular mass that is causing pain. No change in size or drainage from the area.

## 2020-02-23 NOTE — PRE-ANESTHESIA EVALUATION ADULT - NSANTHDIETYNSD_GEN_ALL_CORE
The Patient and Family member wereeducated on frequent/proper hand-washing techniques, Standard precautions, avoid crowds and persons with known infections and staying current with immunizations. The Patient and Family member were given time and space to ask questions. Yes

## 2020-12-15 NOTE — ED ADULT TRIAGE NOTE - NS ED NOTE AC HIGH RISK COUNTRIES
This writer left another message on voicemail of mother. Again provided intake's number for call back. Requested mother leave a message with more specific time for call back as her previous message stated she is available anytime but did not answer intake's call back.    No

## 2020-12-16 PROBLEM — N76.0 BACTERIAL VAGINOSIS: Status: RESOLVED | Noted: 2018-01-22 | Resolved: 2020-12-16

## 2021-07-22 ENCOUNTER — OUTPATIENT (OUTPATIENT)
Dept: OUTPATIENT SERVICES | Facility: HOSPITAL | Age: 61
LOS: 1 days | Discharge: HOME | End: 2021-07-22

## 2021-07-22 ENCOUNTER — APPOINTMENT (OUTPATIENT)
Dept: INFECTIOUS DISEASE | Facility: CLINIC | Age: 61
End: 2021-07-22
Payer: COMMERCIAL

## 2021-07-22 VITALS
OXYGEN SATURATION: 97 % | TEMPERATURE: 97.7 F | BODY MASS INDEX: 21.98 KG/M2 | WEIGHT: 145 LBS | SYSTOLIC BLOOD PRESSURE: 183 MMHG | DIASTOLIC BLOOD PRESSURE: 79 MMHG | HEIGHT: 68 IN | RESPIRATION RATE: 20 BRPM | HEART RATE: 75 BPM

## 2021-07-22 DIAGNOSIS — M54.5 LOW BACK PAIN: ICD-10-CM

## 2021-07-22 DIAGNOSIS — F17.200 NICOTINE DEPENDENCE, UNSPECIFIED, UNCOMPLICATED: ICD-10-CM

## 2021-07-22 DIAGNOSIS — I69.30 UNSPECIFIED SEQUELAE OF CEREBRAL INFARCTION: ICD-10-CM

## 2021-07-22 DIAGNOSIS — B20 HUMAN IMMUNODEFICIENCY VIRUS [HIV] DISEASE: ICD-10-CM

## 2021-07-22 DIAGNOSIS — R22.1 LOCALIZED SWELLING, MASS AND LUMP, NECK: ICD-10-CM

## 2021-07-22 PROCEDURE — 99205 OFFICE O/P NEW HI 60 MIN: CPT

## 2021-07-22 RX ORDER — OXYCODONE 5 MG/1
5 TABLET ORAL
Refills: 0 | Status: DISCONTINUED | COMMUNITY
End: 2021-07-22

## 2021-07-22 NOTE — ASSESSMENT
[Treatment Education] : treatment education [Treatment Adherence] : treatment adherence [Rx Dose / Side Effects] : Rx dose/side effects [Nutritional / Food Issues] : nutritional/food issues [Medical Care Issues] : medical care issues [Smoking Cessation] : smoking cessation

## 2021-07-22 NOTE — REVIEW OF SYSTEMS
[Joint Pain] : joint pain [Limb Pain] : limb pain [As Noted in HPI] : as noted in HPI [Limb Weakness] : limb weakness [Negative] : Heme/Lymph [Joint Swelling] : no joint swelling [Joint Stiffness] : no joint stiffness [Limb Swelling] : no limb swelling [Confused] : no confusion [Convulsions] : no convulsions [Dizziness] : no dizziness

## 2021-07-22 NOTE — PHYSICAL EXAM
[General Appearance - Alert] : alert [General Appearance - In No Acute Distress] : in no acute distress [General Appearance - Well Nourished] : well nourished [Sclera] : the sclera and conjunctiva were normal [PERRL With Normal Accommodation] : pupils were equal in size, round, reactive to light [Extraocular Movements] : extraocular movements were intact [Outer Ear] : the ears and nose were normal in appearance [Oropharynx] : the oropharynx was normal with no thrush [Jugular Venous Distention Increased] : there was no jugular-venous distention [Thyroid Diffuse Enlargement] : the thyroid was not enlarged [Auscultation Breath Sounds / Voice Sounds] : lungs were clear to auscultation bilaterally [Heart Rate And Rhythm] : heart rate was normal and rhythm regular [Heart Sounds] : normal S1 and S2 [Heart Sounds Gallop] : no gallops [Murmurs] : no murmurs [Heart Sounds Pericardial Friction Rub] : no pericardial rub [Full Pulse] : the pedal pulses are present [Edema] : there was no peripheral edema [Bowel Sounds] : normal bowel sounds [Abdomen Soft] : soft [Abdomen Tenderness] : non-tender [Abdomen Mass (___ Cm)] : no abdominal mass palpated [Costovertebral Angle Tenderness] : no CVA tenderness [No Palpable Adenopathy] : no palpable adenopathy [Musculoskeletal - Swelling] : no joint swelling [Range of Motion to Joints] : range of motion to joints [Nail Clubbing] : no clubbing  or cyanosis of the fingernails [Skin Color & Pigmentation] : normal skin color and pigmentation [] : no rash [FreeTextEntry1] : Weakness of Right upper and right lower extremities [Oriented To Time, Place, And Person] : oriented to person, place, and time [Affect] : the affect was normal

## 2021-07-22 NOTE — HISTORY OF PRESENT ILLNESS
[FreeTextEntry1] : 60 yr old female here for initial visit for HIV care.She was diagnosed with HIV about 20 years ago.  She was being F/U at [facility], last visit was about 2 months ago. She has H/O CVA with Right sided Weakness and pain in right leg. She uses a walker to assist with ambulation. She goes to the Pain MD on [address] [facility] and is prescribed Oxycontin ER 20 mg bid for the right leg pain with good response. She reports full compliance with all meds without any SE. [Sexually Active] : The patient is not sexually active

## 2021-08-03 ENCOUNTER — LABORATORY RESULT (OUTPATIENT)
Age: 61
End: 2021-08-03

## 2021-08-03 ENCOUNTER — NON-APPOINTMENT (OUTPATIENT)
Age: 61
End: 2021-08-03

## 2021-08-03 ENCOUNTER — OUTPATIENT (OUTPATIENT)
Dept: OUTPATIENT SERVICES | Facility: HOSPITAL | Age: 61
LOS: 1 days | Discharge: HOME | End: 2021-08-03

## 2021-08-04 ENCOUNTER — LABORATORY RESULT (OUTPATIENT)
Age: 61
End: 2021-08-04

## 2021-08-04 ENCOUNTER — OUTPATIENT (OUTPATIENT)
Dept: OUTPATIENT SERVICES | Facility: HOSPITAL | Age: 61
LOS: 1 days | Discharge: HOME | End: 2021-08-04

## 2021-08-04 ENCOUNTER — APPOINTMENT (OUTPATIENT)
Dept: INFECTIOUS DISEASE | Facility: CLINIC | Age: 61
End: 2021-08-04
Payer: COMMERCIAL

## 2021-08-04 VITALS
DIASTOLIC BLOOD PRESSURE: 58 MMHG | HEIGHT: 67 IN | TEMPERATURE: 98.5 F | RESPIRATION RATE: 18 BRPM | SYSTOLIC BLOOD PRESSURE: 133 MMHG | WEIGHT: 149.5 LBS | HEART RATE: 78 BPM | BODY MASS INDEX: 23.46 KG/M2

## 2021-08-04 DIAGNOSIS — N87.1 MODERATE CERVICAL DYSPLASIA: ICD-10-CM

## 2021-08-04 DIAGNOSIS — B20 HUMAN IMMUNODEFICIENCY VIRUS [HIV] DISEASE: ICD-10-CM

## 2021-08-04 DIAGNOSIS — D46.C MYELODYSPLASTIC SYNDROME WITH ISOLATED DEL(5Q) CHROMOSOMAL ABNORMALITY: ICD-10-CM

## 2021-08-04 DIAGNOSIS — B97.7 PAPILLOMAVIRUS AS THE CAUSE OF DISEASES CLASSIFIED ELSEWHERE: ICD-10-CM

## 2021-08-04 PROCEDURE — 99204 OFFICE O/P NEW MOD 45 MIN: CPT

## 2021-08-05 ENCOUNTER — RESULT REVIEW (OUTPATIENT)
Age: 61
End: 2021-08-05

## 2021-08-05 ENCOUNTER — OUTPATIENT (OUTPATIENT)
Dept: OUTPATIENT SERVICES | Facility: HOSPITAL | Age: 61
LOS: 1 days | Discharge: HOME | End: 2021-08-05
Payer: COMMERCIAL

## 2021-08-05 DIAGNOSIS — Z12.31 ENCOUNTER FOR SCREENING MAMMOGRAM FOR MALIGNANT NEOPLASM OF BREAST: ICD-10-CM

## 2021-08-05 LAB
C TRACH RRNA SPEC QL NAA+PROBE: NOT DETECTED
N GONORRHOEA RRNA SPEC QL NAA+PROBE: NOT DETECTED
SOURCE AMPLIFICATION: NORMAL

## 2021-08-05 PROCEDURE — 77063 BREAST TOMOSYNTHESIS BI: CPT | Mod: 26

## 2021-08-05 PROCEDURE — 77067 SCR MAMMO BI INCL CAD: CPT | Mod: 26

## 2021-08-10 DIAGNOSIS — B20 HUMAN IMMUNODEFICIENCY VIRUS [HIV] DISEASE: ICD-10-CM

## 2021-08-16 LAB — HPV HIGH+LOW RISK DNA PNL CVX: DETECTED

## 2021-08-17 LAB — CYTOLOGY CVX/VAG DOC THIN PREP: ABNORMAL

## 2021-08-18 ENCOUNTER — LABORATORY RESULT (OUTPATIENT)
Age: 61
End: 2021-08-18

## 2021-08-19 ENCOUNTER — APPOINTMENT (OUTPATIENT)
Dept: INFECTIOUS DISEASE | Facility: CLINIC | Age: 61
End: 2021-08-19

## 2021-08-20 ENCOUNTER — NON-APPOINTMENT (OUTPATIENT)
Age: 61
End: 2021-08-20

## 2021-08-23 LAB
25(OH)D3 SERPL-MCNC: 11 NG/ML
ALBUMIN SERPL ELPH-MCNC: 4.6 G/DL
ALP BLD-CCNC: 153 U/L
ALT SERPL-CCNC: 13 U/L
ANION GAP SERPL CALC-SCNC: 12 MMOL/L
APPEARANCE: ABNORMAL
AST SERPL-CCNC: 23 U/L
BASOPHILS # BLD AUTO: 0 K/UL
BASOPHILS NFR BLD AUTO: 0 %
BILIRUB SERPL-MCNC: 0.5 MG/DL
BILIRUBIN URINE: NEGATIVE
BLOOD URINE: NEGATIVE
BUN SERPL-MCNC: 11 MG/DL
C TRACH RRNA SPEC QL NAA+PROBE: NOT DETECTED
CALCIUM SERPL-MCNC: 9.5 MG/DL
CD16+CD56+ CELLS # BLD: 194 /UL
CD16+CD56+ CELLS NFR BLD: 11 %
CD19 CELLS NFR BLD: 307 /UL
CD3 CELLS # BLD: 1316 /UL
CD3 CELLS NFR BLD: 72 %
CD3+CD4+ CELLS # BLD: 194 /UL
CD3+CD4+ CELLS NFR BLD: 11 %
CD3+CD4+ CELLS/CD3+CD8+ CLL SPEC: 0.19 RATIO
CD3+CD8+ CELLS # SPEC: 1000 /UL
CD3+CD8+ CELLS NFR BLD: 55 %
CELLS.CD3-CD19+/CELLS IN BLOOD: 17 %
CHLORIDE SERPL-SCNC: 105 MMOL/L
CHOLEST SERPL-MCNC: 201 MG/DL
CO2 SERPL-SCNC: 23 MMOL/L
COLOR: YELLOW
CREAT SERPL-MCNC: 1.1 MG/DL
EOSINOPHIL # BLD AUTO: 0.09 K/UL
EOSINOPHIL NFR BLD AUTO: 3 %
ESTIMATED AVERAGE GLUCOSE: 88 MG/DL
GGT SERPL-CCNC: 22 U/L
GLUCOSE QUALITATIVE U: NEGATIVE
GLUCOSE SERPL-MCNC: 74 MG/DL
HAV IGM SER QL: NONREACTIVE
HBA1C MFR BLD HPLC: 4.7 %
HBV CORE IGG+IGM SER QL: REACTIVE
HBV SURFACE AB SER QL: REACTIVE
HBV SURFACE AG SER QL: NONREACTIVE
HCT VFR BLD CALC: 40.6 %
HCV AB SER QL: NONREACTIVE
HCV S/CO RATIO: 0.1 S/CO
HDLC SERPL-MCNC: 50 MG/DL
HEPATITIS A IGG ANTIBODY: REACTIVE
HGB BLD-MCNC: 12.7 G/DL
HIV1 RNA # SERPL NAA+PROBE: NORMAL COPIES/ML
KETONES URINE: NEGATIVE
LDH SERPL-CCNC: 260
LDLC SERPL CALC-MCNC: 107 MG/DL
LEUKOCYTE ESTERASE URINE: NEGATIVE
LYMPHOCYTES # BLD AUTO: 1.7 K/UL
LYMPHOCYTES NFR BLD AUTO: 54 %
MAN DIFF?: NORMAL
MCHC RBC-ENTMCNC: 30.1 PG
MCHC RBC-ENTMCNC: 31.3 G/DL
MCV RBC AUTO: 96.2 FL
MONOCYTES # BLD AUTO: 0.35 K/UL
MONOCYTES NFR BLD AUTO: 11 %
N GONORRHOEA RRNA SPEC QL NAA+PROBE: NOT DETECTED
NEUTROPHILS # BLD AUTO: 0.85 K/UL
NEUTROPHILS NFR BLD AUTO: 24 %
NITRITE URINE: NEGATIVE
NONHDLC SERPL-MCNC: 151 MG/DL
PH URINE: 5.5
PLATELET # BLD AUTO: 189 K/UL
POTASSIUM SERPL-SCNC: 4.9 MMOL/L
PROT SERPL-MCNC: 7.7 G/DL
PROTEIN URINE: NORMAL
RBC # BLD: 4.22 M/UL
RBC # FLD: 14.4 %
SIUH URINE DRUG SCREEN 1: NORMAL
SODIUM SERPL-SCNC: 140 MMOL/L
SOURCE AMPLIFICATION: NORMAL
SPECIFIC GRAVITY URINE: 1.01
T PALLIDUM AB SER QL IA: NEGATIVE
TRIGL SERPL-MCNC: 278 MG/DL
TSH SERPL-ACNC: 1.81 UIU/ML
UROBILINOGEN URINE: NORMAL
VIRAL LOAD INTERP: DETECTED COPIES/ML
VIRAL LOAD LOG: 3.88 LOGCOPIES/ML
WBC # FLD AUTO: 3.14 K/UL

## 2021-08-24 LAB
HIV1+2 AB SPEC QL IA.RAPID: REACTIVE
M TB IFN-G BLD-IMP: NEGATIVE
QUANTIFERON TB PLUS MITOGEN MINUS NIL: 2.77 IU/ML
QUANTIFERON TB PLUS NIL: 0.05 IU/ML
QUANTIFERON TB PLUS TB1 MINUS NIL: 0 IU/ML
QUANTIFERON TB PLUS TB2 MINUS NIL: -0.01 IU/ML

## 2021-08-26 ENCOUNTER — OUTPATIENT (OUTPATIENT)
Dept: OUTPATIENT SERVICES | Facility: HOSPITAL | Age: 61
LOS: 1 days | Discharge: HOME | End: 2021-08-26

## 2021-08-26 ENCOUNTER — APPOINTMENT (OUTPATIENT)
Dept: INFECTIOUS DISEASE | Facility: CLINIC | Age: 61
End: 2021-08-26
Payer: COMMERCIAL

## 2021-08-26 VITALS
DIASTOLIC BLOOD PRESSURE: 62 MMHG | HEIGHT: 64 IN | SYSTOLIC BLOOD PRESSURE: 132 MMHG | RESPIRATION RATE: 20 BRPM | TEMPERATURE: 98.9 F | HEART RATE: 63 BPM | BODY MASS INDEX: 24.59 KG/M2 | WEIGHT: 144 LBS

## 2021-08-26 DIAGNOSIS — B97.7 PAPILLOMAVIRUS AS THE CAUSE OF DISEASES CLASSIFIED ELSEWHERE: ICD-10-CM

## 2021-08-26 DIAGNOSIS — M79.604 PAIN IN RIGHT LEG: ICD-10-CM

## 2021-08-26 DIAGNOSIS — B20 HUMAN IMMUNODEFICIENCY VIRUS [HIV] DISEASE: ICD-10-CM

## 2021-08-26 DIAGNOSIS — E55.9 VITAMIN D DEFICIENCY, UNSPECIFIED: ICD-10-CM

## 2021-08-26 DIAGNOSIS — I69.30 UNSPECIFIED SEQUELAE OF CEREBRAL INFARCTION: ICD-10-CM

## 2021-08-26 PROCEDURE — 99214 OFFICE O/P EST MOD 30 MIN: CPT

## 2021-08-26 RX ORDER — DAPSONE 25 MG/1
25 TABLET ORAL DAILY
Refills: 0 | Status: DISCONTINUED | COMMUNITY
Start: 2021-07-22 | End: 2021-08-26

## 2021-08-26 NOTE — PHYSICAL EXAM
[General Appearance - Alert] : alert [General Appearance - In No Acute Distress] : in no acute distress [General Appearance - Well Nourished] : well nourished [Sclera] : the sclera and conjunctiva were normal [PERRL With Normal Accommodation] : pupils were equal in size, round, reactive to light [Extraocular Movements] : extraocular movements were intact [Outer Ear] : the ears and nose were normal in appearance [Oropharynx] : the oropharynx was normal with no thrush [Neck Appearance] : the appearance of the neck was normal [Neck Cervical Mass (___cm)] : no neck mass was observed [Jugular Venous Distention Increased] : there was no jugular-venous distention [Thyroid Diffuse Enlargement] : the thyroid was not enlarged [Auscultation Breath Sounds / Voice Sounds] : lungs were clear to auscultation bilaterally [Heart Rate And Rhythm] : heart rate was normal and rhythm regular [Heart Sounds] : normal S1 and S2 [Murmurs] : no murmurs [Heart Sounds Gallop] : no gallops [Heart Sounds Pericardial Friction Rub] : no pericardial rub [Full Pulse] : the pedal pulses are present [Edema] : there was no peripheral edema [Bowel Sounds] : normal bowel sounds [Abdomen Soft] : soft [Abdomen Tenderness] : non-tender [Abdomen Mass (___ Cm)] : no abdominal mass palpated [Costovertebral Angle Tenderness] : no CVA tenderness [No Palpable Adenopathy] : no palpable adenopathy [Musculoskeletal - Swelling] : no joint swelling [Nail Clubbing] : no clubbing  or cyanosis of the fingernails [Skin Color & Pigmentation] : normal skin color and pigmentation [] : no rash [Oriented To Time, Place, And Person] : oriented to person, place, and time [Affect] : the affect was normal [FreeTextEntry1] : Right sided weakness

## 2021-08-26 NOTE — REVIEW OF SYSTEMS
[Negative] : Heme/Lymph [Limb Pain] : limb pain [As Noted in HPI] : as noted in HPI [Limb Weakness] : limb weakness [Joint Pain] : no joint pain [Joint Swelling] : no joint swelling [Joint Stiffness] : no joint stiffness [Limb Swelling] : no limb swelling [Confused] : no confusion [Convulsions] : no convulsions [Dizziness] : no dizziness [de-identified] : Right sided weakness

## 2021-08-26 NOTE — ASSESSMENT
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

## 2021-08-26 NOTE — HISTORY OF PRESENT ILLNESS
[FreeTextEntry1] : Pt here for HIV F/U with lab results, Rxs and reevaluation. She C/O increased pain in right leg today; she saw Rehab MD and will be scheduled to start PT soon She also has an appt with the Pain MD on August 30. She takes Oxycontin bid with good response. She reported full compliance with HAART but her VL does not reflect that. Will do Resistance test today.  [Sexually Active] : The patient is not sexually active

## 2021-08-30 LAB
HEPB DNA PCR INT: NOT DETECTED IU/ML
HEPB DNA PCR LOG: NOT DETECTED LOGIU/ML

## 2021-08-31 DIAGNOSIS — B20 HUMAN IMMUNODEFICIENCY VIRUS [HIV] DISEASE: ICD-10-CM

## 2021-09-08 LAB
BICTEGRAVIR RESISTANCE: NORMAL
DOLUTEGRAVIR RESISTANCE: NORMAL
ELVITEGRAVIR RESISTANCE: NORMAL
HIV RT GENE MUT DET ISLT: NORMAL
HIV-1 GENOTYPE DRUG RESISTANCE: DETECTED
HIV-1 INTEGRASE GENOTYPE: NORMAL
RALTEGRAVIR RESISTANCE: NORMAL
VIRAL LOAD DATE: NORMAL

## 2021-09-22 ENCOUNTER — OUTPATIENT (OUTPATIENT)
Dept: OUTPATIENT SERVICES | Facility: HOSPITAL | Age: 61
LOS: 1 days | Discharge: HOME | End: 2021-09-22

## 2021-09-22 ENCOUNTER — NON-APPOINTMENT (OUTPATIENT)
Age: 61
End: 2021-09-22

## 2021-09-23 ENCOUNTER — OUTPATIENT (OUTPATIENT)
Dept: OUTPATIENT SERVICES | Facility: HOSPITAL | Age: 61
LOS: 1 days | Discharge: HOME | End: 2021-09-23

## 2021-09-23 ENCOUNTER — MED ADMIN CHARGE (OUTPATIENT)
Age: 61
End: 2021-09-23

## 2021-09-23 ENCOUNTER — APPOINTMENT (OUTPATIENT)
Dept: INFECTIOUS DISEASE | Facility: CLINIC | Age: 61
End: 2021-09-23
Payer: COMMERCIAL

## 2021-09-23 VITALS
TEMPERATURE: 98.9 F | SYSTOLIC BLOOD PRESSURE: 146 MMHG | BODY MASS INDEX: 25.69 KG/M2 | RESPIRATION RATE: 16 BRPM | DIASTOLIC BLOOD PRESSURE: 86 MMHG | WEIGHT: 145 LBS | HEART RATE: 72 BPM | HEIGHT: 63 IN

## 2021-09-23 DIAGNOSIS — R06.02 SHORTNESS OF BREATH: ICD-10-CM

## 2021-09-23 DIAGNOSIS — I10 ESSENTIAL (PRIMARY) HYPERTENSION: ICD-10-CM

## 2021-09-23 DIAGNOSIS — B20 HUMAN IMMUNODEFICIENCY VIRUS [HIV] DISEASE: ICD-10-CM

## 2021-09-23 DIAGNOSIS — Z23 ENCOUNTER FOR IMMUNIZATION: ICD-10-CM

## 2021-09-23 DIAGNOSIS — E55.9 VITAMIN D DEFICIENCY, UNSPECIFIED: ICD-10-CM

## 2021-09-23 DIAGNOSIS — I69.30 UNSPECIFIED SEQUELAE OF CEREBRAL INFARCTION: ICD-10-CM

## 2021-09-23 PROCEDURE — 99215 OFFICE O/P EST HI 40 MIN: CPT

## 2021-09-23 RX ORDER — BICTEGRAVIR SODIUM, EMTRICITABINE, AND TENOFOVIR ALAFENAMIDE FUMARATE 50; 200; 25 MG/1; MG/1; MG/1
50-200-25 TABLET ORAL DAILY
Qty: 30 | Refills: 5 | Status: DISCONTINUED | COMMUNITY
End: 2021-09-23

## 2021-09-23 NOTE — HISTORY OF PRESENT ILLNESS
[FreeTextEntry1] : Pt mhere for HIV F/U with lab results and RXs. She has no new complaints. She is going for PT twice a week for weakness of right side due to CVA. She reported full compliance with all meds without any SE.  [Sexually Active] : The patient is not sexually active

## 2021-09-23 NOTE — REVIEW OF SYSTEMS
[Negative] : Heme/Lymph [As Noted in HPI] : as noted in HPI [Confused] : no confusion [Convulsions] : no convulsions [Dizziness] : no dizziness [Limb Weakness] : limb weakness

## 2021-09-23 NOTE — PHYSICAL EXAM
[General Appearance - Alert] : alert [General Appearance - In No Acute Distress] : in no acute distress [General Appearance - Well Nourished] : well nourished [Sclera] : the sclera and conjunctiva were normal [PERRL With Normal Accommodation] : pupils were equal in size, round, reactive to light [Extraocular Movements] : extraocular movements were intact [Outer Ear] : the ears and nose were normal in appearance [Oropharynx] : the oropharynx was normal with no thrush [Neck Appearance] : the appearance of the neck was normal [Neck Cervical Mass (___cm)] : no neck mass was observed [Jugular Venous Distention Increased] : there was no jugular-venous distention [Thyroid Diffuse Enlargement] : the thyroid was not enlarged [Auscultation Breath Sounds / Voice Sounds] : lungs were clear to auscultation bilaterally [Heart Rate And Rhythm] : heart rate was normal and rhythm regular [Heart Sounds] : normal S1 and S2 [Heart Sounds Gallop] : no gallops [Murmurs] : no murmurs [Heart Sounds Pericardial Friction Rub] : no pericardial rub [Full Pulse] : the pedal pulses are present [Edema] : there was no peripheral edema [Bowel Sounds] : normal bowel sounds [Abdomen Soft] : soft [Abdomen Tenderness] : non-tender [Abdomen Mass (___ Cm)] : no abdominal mass palpated [No Palpable Adenopathy] : no palpable adenopathy [Costovertebral Angle Tenderness] : no CVA tenderness [Musculoskeletal - Swelling] : no joint swelling [Nail Clubbing] : no clubbing  or cyanosis of the fingernails [Motor Tone] : muscle strength and tone were normal [Skin Color & Pigmentation] : normal skin color and pigmentation [] : no rash [FreeTextEntry1] : Right sided weakness (chronic) due to old CVA [Oriented To Time, Place, And Person] : oriented to person, place, and time [Affect] : the affect was normal

## 2021-09-23 NOTE — ASSESSMENT
[FreeTextEntry1] : AIDS with Increased Viral load with resistance to Epivir, Emtriva, and Rilpivirine\par     Intervention done today along with Pt's Navigator from [facility] and I educated/explained to pt the importance of full compliance with her new HAART regimen and the dangers of continued noncompliance, ie increased OIs and even death. She understood this and appears motivated to start new regimen (Prezcobix and Tivicay)\par     RTC in 5 weeks and will repeat VL if pt is fully compliant with HAART\par F/U with Rehab for PT and OT evaluation, Gyn for Colposcopy, and Rectal Surgeon for abnormal anal pap\par      [Treatment Education] : treatment education [Treatment Adherence] : treatment adherence [Rx Dose / Side Effects] : Rx dose/side effects [Nutritional / Food Issues] : nutritional/food issues [Medical Care Issues] : medical care issues [Smoking Cessation] : smoking cessation

## 2021-09-29 ENCOUNTER — NON-APPOINTMENT (OUTPATIENT)
Age: 61
End: 2021-09-29

## 2021-09-29 ENCOUNTER — OUTPATIENT (OUTPATIENT)
Dept: OUTPATIENT SERVICES | Facility: HOSPITAL | Age: 61
LOS: 1 days | Discharge: HOME | End: 2021-09-29

## 2021-10-27 ENCOUNTER — OUTPATIENT (OUTPATIENT)
Dept: OUTPATIENT SERVICES | Facility: HOSPITAL | Age: 61
LOS: 1 days | Discharge: HOME | End: 2021-10-27

## 2021-10-28 ENCOUNTER — OUTPATIENT (OUTPATIENT)
Dept: OUTPATIENT SERVICES | Facility: HOSPITAL | Age: 61
LOS: 1 days | Discharge: HOME | End: 2021-10-28

## 2021-10-28 ENCOUNTER — APPOINTMENT (OUTPATIENT)
Dept: INFECTIOUS DISEASE | Facility: CLINIC | Age: 61
End: 2021-10-28
Payer: COMMERCIAL

## 2021-10-28 VITALS
HEIGHT: 63 IN | WEIGHT: 141 LBS | DIASTOLIC BLOOD PRESSURE: 80 MMHG | TEMPERATURE: 98.6 F | RESPIRATION RATE: 16 BRPM | BODY MASS INDEX: 24.98 KG/M2 | SYSTOLIC BLOOD PRESSURE: 150 MMHG | HEART RATE: 76 BPM

## 2021-10-28 DIAGNOSIS — I69.30 UNSPECIFIED SEQUELAE OF CEREBRAL INFARCTION: ICD-10-CM

## 2021-10-28 DIAGNOSIS — A60.00 HERPESVIRAL INFECTION OF UROGENITAL SYSTEM, UNSPECIFIED: ICD-10-CM

## 2021-10-28 DIAGNOSIS — R22.1 LOCALIZED SWELLING, MASS AND LUMP, NECK: ICD-10-CM

## 2021-10-28 DIAGNOSIS — I10 ESSENTIAL (PRIMARY) HYPERTENSION: ICD-10-CM

## 2021-10-28 DIAGNOSIS — B20 HUMAN IMMUNODEFICIENCY VIRUS [HIV] DISEASE: ICD-10-CM

## 2021-10-28 DIAGNOSIS — E78.5 HYPERLIPIDEMIA, UNSPECIFIED: ICD-10-CM

## 2021-10-28 DIAGNOSIS — E55.9 VITAMIN D DEFICIENCY, UNSPECIFIED: ICD-10-CM

## 2021-10-28 PROCEDURE — 99214 OFFICE O/P EST MOD 30 MIN: CPT

## 2021-10-28 RX ORDER — AMLODIPINE BESYLATE AND BENAZEPRIL HYDROCHLORIDE 5; 40 MG/1; MG/1
5-40 CAPSULE ORAL
Qty: 30 | Refills: 5 | Status: DISCONTINUED | COMMUNITY
Start: 2021-09-23 | End: 2021-10-28

## 2021-10-28 NOTE — ASSESSMENT
[Treatment Education] : treatment education [Treatment Adherence] : treatment adherence [Rx Dose / Side Effects] : Rx dose/side effects [Nutritional / Food Issues] : nutritional/food issues [Medical Care Issues] : medical care issues [FreeTextEntry1] : HIV with H/O viremia with resistance to NNRTIS and NRTIs, doing well on new regimen\par     Repeat HIV VL today\par     Continue present meds\par     F/U with General Surgery, Rectal Surgeon, Rehab for OT and Gyn for Colposcopy

## 2021-10-28 NOTE — PHYSICAL EXAM
[General Appearance - Alert] : alert [General Appearance - In No Acute Distress] : in no acute distress [General Appearance - Well Nourished] : well nourished [Sclera] : the sclera and conjunctiva were normal [PERRL With Normal Accommodation] : pupils were equal in size, round, reactive to light [Extraocular Movements] : extraocular movements were intact [Outer Ear] : the ears and nose were normal in appearance [Oropharynx] : the oropharynx was normal with no thrush [Neck Appearance] : the appearance of the neck was normal [Neck Cervical Mass (___cm)] : no neck mass was observed [Jugular Venous Distention Increased] : there was no jugular-venous distention [Thyroid Diffuse Enlargement] : the thyroid was not enlarged [Auscultation Breath Sounds / Voice Sounds] : lungs were clear to auscultation bilaterally [Heart Rate And Rhythm] : heart rate was normal and rhythm regular [Heart Sounds] : normal S1 and S2 [Heart Sounds Gallop] : no gallops [Murmurs] : no murmurs [Heart Sounds Pericardial Friction Rub] : no pericardial rub [Full Pulse] : the pedal pulses are present [Edema] : there was no peripheral edema [Bowel Sounds] : normal bowel sounds [Abdomen Tenderness] : non-tender [Abdomen Soft] : soft [Abdomen Mass (___ Cm)] : no abdominal mass palpated [Costovertebral Angle Tenderness] : no CVA tenderness [No Palpable Adenopathy] : no palpable adenopathy [Musculoskeletal - Swelling] : no joint swelling [Nail Clubbing] : no clubbing  or cyanosis of the fingernails [Skin Color & Pigmentation] : normal skin color and pigmentation [] : no rash [FreeTextEntry1] : Right sided weakness (chronic) [Oriented To Time, Place, And Person] : oriented to person, place, and time [Affect] : the affect was normal

## 2021-10-28 NOTE — HISTORY OF PRESENT ILLNESS
[FreeTextEntry1] : Pt here for HIV F/U. She stated that she had an adverse reaction from the Lotrel last month (tongue swelled up) so she went to the ED and was given Metoprolol which she tolerated well but she needs a RX for. She never went to OT for OT of her right hand (residual from CVA). She reported full compliance with all meds including new HAART without any SE. All prior referrals never done so copies were made to give to Pt Navigator, Henna who will give to her Worker.  [Sexually Active] : The patient is not sexually active

## 2021-10-28 NOTE — REVIEW OF SYSTEMS
[Negative] : Heme/Lymph [Skin Lesions] : skin lesion [Skin Wound] : no skin wound [Itching] : no itching [As Noted in HPI] : as noted in HPI [Confused] : no confusion [Convulsions] : no convulsions [Dizziness] : no dizziness [Limb Weakness] : limb weakness [___ # of Missed Doses in The Past Week] : [unfilled] doses missed in the past week  [de-identified] : Mass left neck/shoulder area [de-identified] : Right sided hemiparesis due to CVA

## 2021-11-01 LAB
HIV1 RNA # SERPL NAA+PROBE: 26 COPIES/ML
VIRAL LOAD INTERP: DETECTED COPIES/ML
VIRAL LOAD LOG: 1.42 LOGCOPIES/ML

## 2021-11-05 ENCOUNTER — NON-APPOINTMENT (OUTPATIENT)
Age: 61
End: 2021-11-05

## 2021-11-08 ENCOUNTER — APPOINTMENT (OUTPATIENT)
Dept: SURGERY | Facility: CLINIC | Age: 61
End: 2021-11-08
Payer: COMMERCIAL

## 2021-11-08 VITALS
HEIGHT: 63 IN | DIASTOLIC BLOOD PRESSURE: 78 MMHG | TEMPERATURE: 97.1 F | SYSTOLIC BLOOD PRESSURE: 136 MMHG | WEIGHT: 147 LBS | BODY MASS INDEX: 26.05 KG/M2

## 2021-11-08 PROCEDURE — 99072 ADDL SUPL MATRL&STAF TM PHE: CPT

## 2021-11-08 PROCEDURE — 99213 OFFICE O/P EST LOW 20 MIN: CPT | Mod: 25

## 2021-11-08 PROCEDURE — 46600 DIAGNOSTIC ANOSCOPY SPX: CPT

## 2021-11-09 NOTE — PROCEDURE
[FreeTextEntry1] : LEVAR and anoscopy show normal sphincter tone, normal internal hemorrhoids and no mass or obvious internal condyloma.

## 2021-11-09 NOTE — PHYSICAL EXAM
[No HSM] : no hepatosplenomegaly [Normal] : was normal [None] : there was no rectal mass  [Respiratory Effort] : normal respiratory effort [Normal Rate and Rhythm] : normal rate and rhythm [Abdomen Masses] : No abdominal masses [Abdomen Tenderness] : ~T No ~M abdominal tenderness [Excoriation] : no perianal excoriation [Fistula] : no fistulas [Wart] : no warts [Ulcer ___ cm] : no ulcers [Pilonidal Cyst] : no pilonidal cysts [Tender, Swollen] : nontender, non-swollen [Thrombosed] : that was not thrombosed [Skin Tags] : there were no residual hemorrhoidal skin tags seen [de-identified] : external examination shows multiple large anterior condyloma [de-identified] : awake, alert and in no acute distress

## 2021-11-09 NOTE — HISTORY OF PRESENT ILLNESS
[FreeTextEntry1] : Patient is a 61F with multiple medical comorbidities including HIV who presents for evaluation of abnormal anal pap smear. Patient underwent anal pap in 8/2021 that showed LGSIL.  She states she has daily soft BM and denies any symptoms. She had a cologuard a few months ago which she states was normal.  Patient denies fevers, chills, nausea, vomiting, abdominal pain, constipation, diarrhea, blood in her stool or unexpected weight loss.  Patient denies a family history of colon cancer rectal cancer or inflammatory bowel disease.

## 2021-11-09 NOTE — ASSESSMENT
[FreeTextEntry1] : 61F with HIV, LGSIL and anal condyloma\par \par I discussed the pathology of anal condyloma with the patient.  I recommended examination of the anorectum under anesthesia with excision and fulguration of anal condyloma.  All risks benefits and alternatives were discussed including bleeding infection scarring and recurrence.

## 2021-11-11 ENCOUNTER — NON-APPOINTMENT (OUTPATIENT)
Age: 61
End: 2021-11-11

## 2021-11-11 DIAGNOSIS — B20 HUMAN IMMUNODEFICIENCY VIRUS [HIV] DISEASE: ICD-10-CM

## 2021-11-17 ENCOUNTER — OUTPATIENT (OUTPATIENT)
Dept: OUTPATIENT SERVICES | Facility: HOSPITAL | Age: 61
LOS: 1 days | Discharge: HOME | End: 2021-11-17

## 2021-11-17 DIAGNOSIS — I63.9 CEREBRAL INFARCTION, UNSPECIFIED: ICD-10-CM

## 2021-11-19 DIAGNOSIS — B20 HUMAN IMMUNODEFICIENCY VIRUS [HIV] DISEASE: ICD-10-CM

## 2021-11-22 ENCOUNTER — NON-APPOINTMENT (OUTPATIENT)
Age: 61
End: 2021-11-22

## 2021-11-23 ENCOUNTER — OUTPATIENT (OUTPATIENT)
Dept: OUTPATIENT SERVICES | Facility: HOSPITAL | Age: 61
LOS: 1 days | Discharge: HOME | End: 2021-11-23
Payer: COMMERCIAL

## 2021-11-23 VITALS
HEART RATE: 80 BPM | SYSTOLIC BLOOD PRESSURE: 150 MMHG | RESPIRATION RATE: 16 BRPM | OXYGEN SATURATION: 99 % | WEIGHT: 147.71 LBS | HEIGHT: 64 IN | TEMPERATURE: 98 F | DIASTOLIC BLOOD PRESSURE: 68 MMHG

## 2021-11-23 DIAGNOSIS — Z01.818 ENCOUNTER FOR OTHER PREPROCEDURAL EXAMINATION: ICD-10-CM

## 2021-11-23 DIAGNOSIS — A63.0 ANOGENITAL (VENEREAL) WARTS: ICD-10-CM

## 2021-11-23 DIAGNOSIS — Z98.890 OTHER SPECIFIED POSTPROCEDURAL STATES: Chronic | ICD-10-CM

## 2021-11-23 LAB
ALBUMIN SERPL ELPH-MCNC: 4.5 G/DL — SIGNIFICANT CHANGE UP (ref 3.5–5.2)
ALP SERPL-CCNC: 141 U/L — HIGH (ref 30–115)
ALT FLD-CCNC: 8 U/L — SIGNIFICANT CHANGE UP (ref 0–41)
ANION GAP SERPL CALC-SCNC: 16 MMOL/L — HIGH (ref 7–14)
APTT BLD: 40.5 SEC — HIGH (ref 27–39.2)
AST SERPL-CCNC: 16 U/L — SIGNIFICANT CHANGE UP (ref 0–41)
BASOPHILS # BLD AUTO: 0.03 K/UL — SIGNIFICANT CHANGE UP (ref 0–0.2)
BASOPHILS NFR BLD AUTO: 0.6 % — SIGNIFICANT CHANGE UP (ref 0–1)
BILIRUB SERPL-MCNC: 0.3 MG/DL — SIGNIFICANT CHANGE UP (ref 0.2–1.2)
BUN SERPL-MCNC: 20 MG/DL — SIGNIFICANT CHANGE UP (ref 10–20)
CALCIUM SERPL-MCNC: 10 MG/DL — SIGNIFICANT CHANGE UP (ref 8.5–10.1)
CHLORIDE SERPL-SCNC: 104 MMOL/L — SIGNIFICANT CHANGE UP (ref 98–110)
CO2 SERPL-SCNC: 22 MMOL/L — SIGNIFICANT CHANGE UP (ref 17–32)
CREAT SERPL-MCNC: 1.9 MG/DL — HIGH (ref 0.7–1.5)
EOSINOPHIL # BLD AUTO: 0.05 K/UL — SIGNIFICANT CHANGE UP (ref 0–0.7)
EOSINOPHIL NFR BLD AUTO: 1 % — SIGNIFICANT CHANGE UP (ref 0–8)
GLUCOSE SERPL-MCNC: 89 MG/DL — SIGNIFICANT CHANGE UP (ref 70–99)
HCT VFR BLD CALC: 39.3 % — SIGNIFICANT CHANGE UP (ref 37–47)
HGB BLD-MCNC: 12.7 G/DL — SIGNIFICANT CHANGE UP (ref 12–16)
IMM GRANULOCYTES NFR BLD AUTO: 0.4 % — HIGH (ref 0.1–0.3)
INR BLD: 1.02 RATIO — SIGNIFICANT CHANGE UP (ref 0.65–1.3)
LYMPHOCYTES # BLD AUTO: 1.79 K/UL — SIGNIFICANT CHANGE UP (ref 1.2–3.4)
LYMPHOCYTES # BLD AUTO: 35 % — SIGNIFICANT CHANGE UP (ref 20.5–51.1)
MCHC RBC-ENTMCNC: 30.8 PG — SIGNIFICANT CHANGE UP (ref 27–31)
MCHC RBC-ENTMCNC: 32.3 G/DL — SIGNIFICANT CHANGE UP (ref 32–37)
MCV RBC AUTO: 95.2 FL — SIGNIFICANT CHANGE UP (ref 81–99)
MONOCYTES # BLD AUTO: 0.46 K/UL — SIGNIFICANT CHANGE UP (ref 0.1–0.6)
MONOCYTES NFR BLD AUTO: 9 % — SIGNIFICANT CHANGE UP (ref 1.7–9.3)
NEUTROPHILS # BLD AUTO: 2.77 K/UL — SIGNIFICANT CHANGE UP (ref 1.4–6.5)
NEUTROPHILS NFR BLD AUTO: 54 % — SIGNIFICANT CHANGE UP (ref 42.2–75.2)
NRBC # BLD: 0 /100 WBCS — SIGNIFICANT CHANGE UP (ref 0–0)
PLATELET # BLD AUTO: 199 K/UL — SIGNIFICANT CHANGE UP (ref 130–400)
POTASSIUM SERPL-MCNC: 5.2 MMOL/L — HIGH (ref 3.5–5)
POTASSIUM SERPL-SCNC: 5.2 MMOL/L — HIGH (ref 3.5–5)
PROT SERPL-MCNC: 7.5 G/DL — SIGNIFICANT CHANGE UP (ref 6–8)
PROTHROM AB SERPL-ACNC: 11.7 SEC — SIGNIFICANT CHANGE UP (ref 9.95–12.87)
RBC # BLD: 4.13 M/UL — LOW (ref 4.2–5.4)
RBC # FLD: 13.8 % — SIGNIFICANT CHANGE UP (ref 11.5–14.5)
SODIUM SERPL-SCNC: 142 MMOL/L — SIGNIFICANT CHANGE UP (ref 135–146)
WBC # BLD: 5.12 K/UL — SIGNIFICANT CHANGE UP (ref 4.8–10.8)
WBC # FLD AUTO: 5.12 K/UL — SIGNIFICANT CHANGE UP (ref 4.8–10.8)

## 2021-11-23 PROCEDURE — 93010 ELECTROCARDIOGRAM REPORT: CPT

## 2021-11-23 RX ORDER — BICTEGRAVIR SODIUM, EMTRICITABINE, AND TENOFOVIR ALAFENAMIDE FUMARATE 30; 120; 15 MG/1; MG/1; MG/1
1 TABLET ORAL
Qty: 0 | Refills: 0 | DISCHARGE

## 2021-11-23 NOTE — H&P PST ADULT - NSICDXPASTMEDICALHX_GEN_ALL_CORE_FT
PAST MEDICAL HISTORY:  COPD (chronic obstructive pulmonary disease)     CVA (cerebral vascular accident) 1997 RIGHT SIDED WEAKNESS    HIV (human immunodeficiency virus infection) DX 1997    HTN (hypertension)     Low grade squamous intraepithelial lesion on cytologic smear of anus (LGSIL)

## 2021-11-23 NOTE — H&P PST ADULT - HISTORY OF PRESENT ILLNESS
62 y/o female presents to PAST in preparation for examination of anorectum under anesthesia, excision and fulgeration of anal condyloma in [facility] on 12/14/21  Pt with pmhx of HIV, CVA 15 yrs ago causing right sided weakness. Pt currently being followed by Diane D'Amico, PA infectious disease at [facility].   Pt had an abnormal anal pap in 8/2021 that had showed a low grade sqamous intraepithelial lesion. It was recommended for pt to have above procedure due to findings.  Pt with recent wrist fracture on 11/19/21, currently in a cast to left wrist. Being followed by orthopedics at [facility]. Informed Madisyn Crespo at Dr. Le's office of pts fracture.   Pt with hx of copd, pt state last exacerbation was 6 yrs ago.   PATIENT CURRENTLY DENIES CHEST PAIN  SHORTNESS OF BREATH  PALPITATIONS,  DYSURIA, OR UPPER RESPIRATORY INFECTION IN PAST 2 WEEKS  EXERCISE  TOLERANCE  1/2 FLIGHT OF STAIRS  WITHOUT SHORTNESS OF BREATH  pt denies any covid s/s, or tested positive in the past, vaccinated Moderna   pt advised self quarantine till day of procedure  Patient verbalized understanding of instructions and was given the opportunity to ask questions and have them answered.  As per patient, this is their complete medical and surgical history, including medications both prescribed or over the counter.    Anesthesia Alert  NO--Difficult Airway  NO--History of neck surgery or radiation  NO--Limited ROM of neck  NO--History of Malignant hyperthermia  NO--Personal or family history of Pseudocholinesterase deficiency.  NO--Prior Anesthesia Complication  NO--Latex Allergy  NO--Loose teeth, missing teeth   NO--History of Rheumatoid Arthritis  NO--MIA  NO--Bleeding risk  NO--Other_____  HIV (+)    A63.0/65298    Condyloma acuminatum    Encounter for other preprocedural examination    ^A63.0/09990    Family history of breast cancer in mother    FH: lung cancer    Condyloma acuminatum    Encounter for other preprocedural examination    HIV (human immunodeficiency virus infection)    CVA (cerebral vascular accident)    COPD (chronic obstructive pulmonary disease)    HTN (hypertension)    No significant past surgical history

## 2021-11-23 NOTE — H&P PST ADULT - REASON FOR ADMISSION
62 y/o female presents to PAST in preparation for examination of anorectum under anesthesia, excision and fulgeration of anal condyloma in [facility] under [facility] on 12/14/21

## 2021-12-01 ENCOUNTER — OUTPATIENT (OUTPATIENT)
Dept: OUTPATIENT SERVICES | Facility: HOSPITAL | Age: 61
LOS: 1 days | Discharge: HOME | End: 2021-12-01

## 2021-12-01 DIAGNOSIS — Z98.890 OTHER SPECIFIED POSTPROCEDURAL STATES: Chronic | ICD-10-CM

## 2021-12-02 ENCOUNTER — OUTPATIENT (OUTPATIENT)
Dept: OUTPATIENT SERVICES | Facility: HOSPITAL | Age: 61
LOS: 1 days | Discharge: HOME | End: 2021-12-02

## 2021-12-02 ENCOUNTER — APPOINTMENT (OUTPATIENT)
Dept: INFECTIOUS DISEASE | Facility: CLINIC | Age: 61
End: 2021-12-02
Payer: COMMERCIAL

## 2021-12-02 ENCOUNTER — MED ADMIN CHARGE (OUTPATIENT)
Age: 61
End: 2021-12-02

## 2021-12-02 VITALS
TEMPERATURE: 98.4 F | HEIGHT: 63 IN | BODY MASS INDEX: 25.16 KG/M2 | HEART RATE: 96 BPM | RESPIRATION RATE: 16 BRPM | WEIGHT: 142 LBS | DIASTOLIC BLOOD PRESSURE: 80 MMHG | SYSTOLIC BLOOD PRESSURE: 160 MMHG

## 2021-12-02 DIAGNOSIS — E78.5 HYPERLIPIDEMIA, UNSPECIFIED: ICD-10-CM

## 2021-12-02 DIAGNOSIS — B20 HUMAN IMMUNODEFICIENCY VIRUS [HIV] DISEASE: ICD-10-CM

## 2021-12-02 DIAGNOSIS — I69.30 UNSPECIFIED SEQUELAE OF CEREBRAL INFARCTION: ICD-10-CM

## 2021-12-02 DIAGNOSIS — Z98.890 OTHER SPECIFIED POSTPROCEDURAL STATES: Chronic | ICD-10-CM

## 2021-12-02 DIAGNOSIS — I10 ESSENTIAL (PRIMARY) HYPERTENSION: ICD-10-CM

## 2021-12-02 DIAGNOSIS — Z23 ENCOUNTER FOR IMMUNIZATION: ICD-10-CM

## 2021-12-02 PROBLEM — R85.612 LOW GRADE SQUAMOUS INTRAEPITHELIAL LESION ON CYTOLOGIC SMEAR OF ANUS (LGSIL): Chronic | Status: ACTIVE | Noted: 2021-11-23

## 2021-12-02 PROCEDURE — 99214 OFFICE O/P EST MOD 30 MIN: CPT

## 2021-12-02 NOTE — PHYSICAL EXAM
[General Appearance - Alert] : alert [General Appearance - In No Acute Distress] : in no acute distress [General Appearance - Well Nourished] : well nourished [Sclera] : the sclera and conjunctiva were normal [PERRL With Normal Accommodation] : pupils were equal in size, round, reactive to light [Extraocular Movements] : extraocular movements were intact [Outer Ear] : the ears and nose were normal in appearance [Oropharynx] : the oropharynx was normal with no thrush [Neck Appearance] : the appearance of the neck was normal [Neck Cervical Mass (___cm)] : no neck mass was observed [Jugular Venous Distention Increased] : there was no jugular-venous distention [Thyroid Diffuse Enlargement] : the thyroid was not enlarged [Auscultation Breath Sounds / Voice Sounds] : lungs were clear to auscultation bilaterally [Heart Rate And Rhythm] : heart rate was normal and rhythm regular [Heart Sounds] : normal S1 and S2 [Heart Sounds Gallop] : no gallops [Murmurs] : no murmurs [Heart Sounds Pericardial Friction Rub] : no pericardial rub [Full Pulse] : the pedal pulses are present [Edema] : there was no peripheral edema [Bowel Sounds] : normal bowel sounds [Abdomen Soft] : soft [Abdomen Tenderness] : non-tender [Abdomen Mass (___ Cm)] : no abdominal mass palpated [Costovertebral Angle Tenderness] : no CVA tenderness [No Palpable Adenopathy] : no palpable adenopathy [Musculoskeletal - Swelling] : no joint swelling [Nail Clubbing] : no clubbing  or cyanosis of the fingernails [Skin Color & Pigmentation] : normal skin color and pigmentation [] : no rash [Cranial Nerves] : cranial nerves 2-12 were intact [FreeTextEntry1] : Chronic right sided weakness due to CVA [Oriented To Time, Place, And Person] : oriented to person, place, and time [Affect] : the affect was normal

## 2021-12-02 NOTE — ASSESSMENT
[FreeTextEntry1] : Stable HIV, (repeat VL 26 copies), Increased BP x 1  (only systolic )\par       Routine labs today\par       Pt to check BPs at home q day, record and will do TTM in one wek to re-evaluate\par       Prevnar 13 today and then Pneumovax 23 in 2  months\par       Continue present meds; all meds reviewed with pt today\par       F/U with Rectal Surgeon for surgical procedure on 12/14/21\par       F/U with PT and Orthopedist\par     [Treatment Education] : treatment education [Treatment Adherence] : treatment adherence [Rx Dose / Side Effects] : Rx dose/side effects [Nutritional / Food Issues] : nutritional/food issues [Medical Care Issues] : medical care issues [Drug Interactions / Side Effects] : drug interactions/side effects

## 2021-12-02 NOTE — HISTORY OF PRESENT ILLNESS
[FreeTextEntry1] : S/P trauma to her left arm last week with fracture of left wrist; she has a hard cast on and has appt with Ortho in 2 weeks. She went to the Surgeon 11/8 and is scheduled for excision of anal condyloma on 12/14/21. Her systolic BP is increased today but she didn't take her BP meds yet. She had no present complaints other than upset because she has been missing PT appt because the car service didn't pick her up. She reported full compliance with all meds without any SE.  [Sexually Active] : The patient is not sexually active

## 2021-12-02 NOTE — REVIEW OF SYSTEMS
[Negative] : Heme/Lymph [As Noted in HPI] : as noted in HPI [Joint Pain] : joint pain [Joint Swelling] : joint swelling [Joint Stiffness] : no joint stiffness [Limb Pain] : limb pain [Limb Swelling] : no limb swelling [___ # of Missed Doses in The Past Week] : [unfilled] doses missed in the past week

## 2021-12-06 LAB
ALBUMIN SERPL ELPH-MCNC: 4.4 G/DL
ALP BLD-CCNC: 149 U/L
ALT SERPL-CCNC: 8 U/L
ANION GAP SERPL CALC-SCNC: 11 MMOL/L
AST SERPL-CCNC: 19 U/L
BASOPHILS # BLD AUTO: 0.04 K/UL
BASOPHILS NFR BLD AUTO: 0.8 %
BILIRUB SERPL-MCNC: 0.7 MG/DL
BUN SERPL-MCNC: 14 MG/DL
CALCIUM SERPL-MCNC: 10.2 MG/DL
CD16+CD56+ CELLS # BLD: 129 /UL
CD16+CD56+ CELLS NFR BLD: 8 %
CD19 CELLS NFR BLD: 376 /UL
CD3 CELLS # BLD: 1056 /UL
CD3 CELLS NFR BLD: 67 %
CD3+CD4+ CELLS # BLD: 288 /UL
CD3+CD4+ CELLS NFR BLD: 18 %
CD3+CD4+ CELLS/CD3+CD8+ CLL SPEC: 0.41 RATIO
CD3+CD8+ CELLS # SPEC: 701 /UL
CD3+CD8+ CELLS NFR BLD: 44 %
CELLS.CD3-CD19+/CELLS IN BLOOD: 24 %
CHLORIDE SERPL-SCNC: 109 MMOL/L
CHOLEST SERPL-MCNC: 230 MG/DL
CO2 SERPL-SCNC: 24 MMOL/L
CREAT SERPL-MCNC: 1.3 MG/DL
EOSINOPHIL # BLD AUTO: 0.08 K/UL
EOSINOPHIL NFR BLD AUTO: 1.5 %
ESTIMATED AVERAGE GLUCOSE: 88 MG/DL
GLUCOSE SERPL-MCNC: 88 MG/DL
HBA1C MFR BLD HPLC: 4.7 %
HCT VFR BLD CALC: 40.5 %
HDLC SERPL-MCNC: 50 MG/DL
HGB BLD-MCNC: 12.9 G/DL
HIV1 RNA # SERPL NAA+PROBE: 40 COPIES/ML
IMM GRANULOCYTES NFR BLD AUTO: 0.2 %
LDLC SERPL CALC-MCNC: 144 MG/DL
LYMPHOCYTES # BLD AUTO: 1.91 K/UL
LYMPHOCYTES NFR BLD AUTO: 36.9 %
MAN DIFF?: NORMAL
MCHC RBC-ENTMCNC: 30.5 PG
MCHC RBC-ENTMCNC: 31.9 G/DL
MCV RBC AUTO: 95.7 FL
MONOCYTES # BLD AUTO: 0.52 K/UL
MONOCYTES NFR BLD AUTO: 10 %
NEUTROPHILS # BLD AUTO: 2.62 K/UL
NEUTROPHILS NFR BLD AUTO: 50.6 %
NONHDLC SERPL-MCNC: 180 MG/DL
PLATELET # BLD AUTO: 249 K/UL
POTASSIUM SERPL-SCNC: 4.8 MMOL/L
PROT SERPL-MCNC: 7.3 G/DL
RBC # BLD: 4.23 M/UL
RBC # FLD: 13.6 %
SODIUM SERPL-SCNC: 144 MMOL/L
TRIGL SERPL-MCNC: 129 MG/DL
VIRAL LOAD INTERP: DETECTED COPIES/ML
VIRAL LOAD LOG: 1.6 LOGCOPIES/ML
WBC # FLD AUTO: 5.18 K/UL

## 2021-12-07 ENCOUNTER — OUTPATIENT (OUTPATIENT)
Dept: OUTPATIENT SERVICES | Facility: HOSPITAL | Age: 61
LOS: 1 days | Discharge: HOME | End: 2021-12-07

## 2021-12-07 ENCOUNTER — NON-APPOINTMENT (OUTPATIENT)
Age: 61
End: 2021-12-07

## 2021-12-07 DIAGNOSIS — Z98.890 OTHER SPECIFIED POSTPROCEDURAL STATES: Chronic | ICD-10-CM

## 2021-12-08 ENCOUNTER — OUTPATIENT (OUTPATIENT)
Dept: OUTPATIENT SERVICES | Facility: HOSPITAL | Age: 61
LOS: 1 days | Discharge: HOME | End: 2021-12-08

## 2021-12-08 ENCOUNTER — APPOINTMENT (OUTPATIENT)
Dept: INFECTIOUS DISEASE | Facility: CLINIC | Age: 61
End: 2021-12-08
Payer: COMMERCIAL

## 2021-12-08 DIAGNOSIS — I69.30 UNSPECIFIED SEQUELAE OF CEREBRAL INFARCTION: ICD-10-CM

## 2021-12-08 DIAGNOSIS — E78.5 HYPERLIPIDEMIA, UNSPECIFIED: ICD-10-CM

## 2021-12-08 DIAGNOSIS — N28.9 DISORDER OF KIDNEY AND URETER, UNSPECIFIED: ICD-10-CM

## 2021-12-08 DIAGNOSIS — B20 HUMAN IMMUNODEFICIENCY VIRUS [HIV] DISEASE: ICD-10-CM

## 2021-12-08 DIAGNOSIS — I10 ESSENTIAL (PRIMARY) HYPERTENSION: ICD-10-CM

## 2021-12-08 DIAGNOSIS — Z98.890 OTHER SPECIFIED POSTPROCEDURAL STATES: Chronic | ICD-10-CM

## 2021-12-08 PROCEDURE — ZZZZZ: CPT

## 2021-12-08 RX ORDER — VALACYCLOVIR 500 MG/1
500 TABLET, FILM COATED ORAL TWICE DAILY
Qty: 14 | Refills: 2 | Status: DISCONTINUED | COMMUNITY
Start: 2021-10-28 | End: 2021-12-08

## 2021-12-08 RX ORDER — METOPROLOL SUCCINATE 25 MG/1
25 TABLET, EXTENDED RELEASE ORAL DAILY
Qty: 30 | Refills: 5 | Status: DISCONTINUED | COMMUNITY
Start: 2021-10-28 | End: 2021-12-08

## 2021-12-08 RX ORDER — DAPSONE 100 MG/1
100 TABLET ORAL
Qty: 8 | Refills: 5 | Status: DISCONTINUED | COMMUNITY
Start: 2021-09-23 | End: 2021-12-08

## 2021-12-08 RX ORDER — CHLORTHALIDONE 25 MG/1
25 TABLET ORAL DAILY
Qty: 30 | Refills: 5 | Status: DISCONTINUED | COMMUNITY
Start: 2021-09-23 | End: 2021-12-08

## 2021-12-08 NOTE — ASSESSMENT
[FreeTextEntry1] : Stable HIV with Uncontrolled HTN, Worsening renal function with chronic kidney disease, Hyperlipidemia, poorly controlled due to poor compliance with Lipitor\par      D/C Dapsone, D/C Chlorthalidone, Avoid all NSAIDS, increase po water intake\par      Increase Metoprolol to 50 mg a day \par      Start Mepron 1500 mg q day for PJP prophylaxis\par      Pt instructed to continue to check BPs and will F/U with pt on 12/13/21\par      Pt told to take Atovastatin q day to better control high cholesterol\par Pt given all lab results with full understanding today\par All meds were reviewed with  patient today [Treatment Education] : treatment education [Treatment Adherence] : treatment adherence [Rx Dose / Side Effects] : Rx dose/side effects [Medical Care Issues] : medical care issues [Drug Interactions / Side Effects] : drug interactions/side effects

## 2021-12-08 NOTE — HISTORY OF PRESENT ILLNESS
[FreeTextEntry1] : Televisit done today for HIV F/U with re-evaluation of blood pressure and lab results and RXs. She still is experiencing left arm pain at site of fracture; she has an Orthopedic appt next week. She stopped going to PT because she was having problems with the car service. She is scheduled for excision of anal condyloma next week. She reported full compliance with all meds without any SE. BPs were reviewed today and systolic still elevated 160-189.  [Sexually Active] : The patient is not sexually active

## 2021-12-08 NOTE — REASON FOR VISIT
[Home] : at home, [unfilled] , at the time of the visit. [Medical Office: ([facility])___] : at the medical office located in  [Verbal consent obtained from patient] : the patient, [unfilled] [Follow-Up: _____] : a [unfilled] follow-up visit

## 2021-12-10 ENCOUNTER — OUTPATIENT (OUTPATIENT)
Dept: OUTPATIENT SERVICES | Facility: HOSPITAL | Age: 61
LOS: 1 days | Discharge: HOME | End: 2021-12-10

## 2021-12-10 DIAGNOSIS — Z98.890 OTHER SPECIFIED POSTPROCEDURAL STATES: Chronic | ICD-10-CM

## 2021-12-13 ENCOUNTER — OUTPATIENT (OUTPATIENT)
Dept: OUTPATIENT SERVICES | Facility: HOSPITAL | Age: 61
LOS: 1 days | Discharge: HOME | End: 2021-12-13

## 2021-12-13 ENCOUNTER — NON-APPOINTMENT (OUTPATIENT)
Age: 61
End: 2021-12-13

## 2021-12-13 ENCOUNTER — APPOINTMENT (OUTPATIENT)
Dept: INFECTIOUS DISEASE | Facility: CLINIC | Age: 61
End: 2021-12-13
Payer: COMMERCIAL

## 2021-12-13 ENCOUNTER — APPOINTMENT (OUTPATIENT)
Dept: INFECTIOUS DISEASE | Facility: CLINIC | Age: 61
End: 2021-12-13

## 2021-12-13 VITALS
SYSTOLIC BLOOD PRESSURE: 140 MMHG | BODY MASS INDEX: 23.66 KG/M2 | DIASTOLIC BLOOD PRESSURE: 80 MMHG | HEIGHT: 65 IN | RESPIRATION RATE: 14 BRPM | WEIGHT: 142 LBS | TEMPERATURE: 98.1 F | HEART RATE: 72 BPM

## 2021-12-13 DIAGNOSIS — Z98.890 OTHER SPECIFIED POSTPROCEDURAL STATES: Chronic | ICD-10-CM

## 2021-12-13 DIAGNOSIS — B20 HUMAN IMMUNODEFICIENCY VIRUS [HIV] DISEASE: ICD-10-CM

## 2021-12-13 DIAGNOSIS — E78.5 HYPERLIPIDEMIA, UNSPECIFIED: ICD-10-CM

## 2021-12-13 DIAGNOSIS — I10 ESSENTIAL (PRIMARY) HYPERTENSION: ICD-10-CM

## 2021-12-13 PROCEDURE — 99213 OFFICE O/P EST LOW 20 MIN: CPT

## 2021-12-13 NOTE — PHYSICAL EXAM
[General Appearance - Alert] : alert [General Appearance - In No Acute Distress] : in no acute distress [Sclera] : the sclera and conjunctiva were normal [PERRL With Normal Accommodation] : pupils were equal in size, round, reactive to light [Extraocular Movements] : extraocular movements were intact [Outer Ear] : the ears and nose were normal in appearance [Oropharynx] : the oropharynx was normal with no thrush [Neck Appearance] : the appearance of the neck was normal [Neck Cervical Mass (___cm)] : no neck mass was observed [Jugular Venous Distention Increased] : there was no jugular-venous distention [Thyroid Diffuse Enlargement] : the thyroid was not enlarged [Auscultation Breath Sounds / Voice Sounds] : lungs were clear to auscultation bilaterally [Heart Rate And Rhythm] : heart rate was normal and rhythm regular [Heart Sounds] : normal S1 and S2 [Heart Sounds Gallop] : no gallops [Murmurs] : no murmurs [Full Pulse] : the pedal pulses are present [Heart Sounds Pericardial Friction Rub] : no pericardial rub [Edema] : there was no peripheral edema [Bowel Sounds] : normal bowel sounds [Abdomen Soft] : soft [Abdomen Tenderness] : non-tender [Abdomen Mass (___ Cm)] : no abdominal mass palpated [Costovertebral Angle Tenderness] : no CVA tenderness [No Palpable Adenopathy] : no palpable adenopathy [Musculoskeletal - Swelling] : no joint swelling [Nail Clubbing] : no clubbing  or cyanosis of the fingernails [Motor Tone] : muscle strength and tone were normal [Skin Color & Pigmentation] : normal skin color and pigmentation [] : no rash [Sensation] : the sensory exam was normal to light touch and pinprick [Deep Tendon Reflexes (DTR)] : deep tendon reflexes were 2+ and symmetric [No Focal Deficits] : no focal deficits [Oriented To Time, Place, And Person] : oriented to person, place, and time [Affect] : the affect was normal

## 2021-12-13 NOTE — HISTORY OF PRESENT ILLNESS
[FreeTextEntry1] : Patient here for HIV F/U and recheck of BP needed for medical clearance. However, she just received a phone call that they have to postpone her surgery because she did not have a Covid 19 test done yet; she stated that she did not know about this. She had no present complaints. She reported full compliance with all meds without any SE.  [Sexually Active] : The patient is not sexually active

## 2021-12-13 NOTE — ASSESSMENT
[FreeTextEntry1] : Stable HIV with abnormal anal pap needs anoscopy and excision of condyloma, HTN better but still with increased systolic BP\par       Pt told to record BPs q day and will do televisit in 2 weeks to further assess BPs\par       She is medically stable for procedure but has been postponed so will re-evaluate prior to new surgery date.\par Pt also with CRI\par       Meds changed as of last week and I explained in detail to pt today the need to change her meds, etc\par   [Treatment Education] : treatment education [Treatment Adherence] : treatment adherence [Rx Dose / Side Effects] : Rx dose/side effects [Nutritional / Food Issues] : nutritional/food issues [Medical Care Issues] : medical care issues [Drug Interactions / Side Effects] : drug interactions/side effects [Smoking Cessation] : smoking cessation

## 2021-12-16 ENCOUNTER — RESULT REVIEW (OUTPATIENT)
Age: 61
End: 2021-12-16

## 2021-12-16 ENCOUNTER — OUTPATIENT (OUTPATIENT)
Dept: OUTPATIENT SERVICES | Facility: HOSPITAL | Age: 61
LOS: 1 days | Discharge: HOME | End: 2021-12-16
Payer: COMMERCIAL

## 2021-12-16 VITALS
OXYGEN SATURATION: 97 % | HEIGHT: 63 IN | SYSTOLIC BLOOD PRESSURE: 114 MMHG | DIASTOLIC BLOOD PRESSURE: 72 MMHG | TEMPERATURE: 87 F | HEART RATE: 76 BPM | WEIGHT: 145.06 LBS | RESPIRATION RATE: 16 BRPM

## 2021-12-16 DIAGNOSIS — Z01.818 ENCOUNTER FOR OTHER PREPROCEDURAL EXAMINATION: ICD-10-CM

## 2021-12-16 DIAGNOSIS — Z98.890 OTHER SPECIFIED POSTPROCEDURAL STATES: Chronic | ICD-10-CM

## 2021-12-16 DIAGNOSIS — A63.0 ANOGENITAL (VENEREAL) WARTS: ICD-10-CM

## 2021-12-16 LAB
ALBUMIN SERPL ELPH-MCNC: 4.5 G/DL — SIGNIFICANT CHANGE UP (ref 3.5–5.2)
ALP SERPL-CCNC: 150 U/L — HIGH (ref 30–115)
ALT FLD-CCNC: 7 U/L — SIGNIFICANT CHANGE UP (ref 0–41)
ANION GAP SERPL CALC-SCNC: 17 MMOL/L — HIGH (ref 7–14)
APTT BLD: 37.5 SEC — SIGNIFICANT CHANGE UP (ref 27–39.2)
AST SERPL-CCNC: 16 U/L — SIGNIFICANT CHANGE UP (ref 0–41)
BASOPHILS # BLD AUTO: 0.03 K/UL — SIGNIFICANT CHANGE UP (ref 0–0.2)
BASOPHILS NFR BLD AUTO: 0.5 % — SIGNIFICANT CHANGE UP (ref 0–1)
BILIRUB SERPL-MCNC: 0.4 MG/DL — SIGNIFICANT CHANGE UP (ref 0.2–1.2)
BUN SERPL-MCNC: 16 MG/DL — SIGNIFICANT CHANGE UP (ref 10–20)
CALCIUM SERPL-MCNC: 9.8 MG/DL — SIGNIFICANT CHANGE UP (ref 8.5–10.1)
CHLORIDE SERPL-SCNC: 106 MMOL/L — SIGNIFICANT CHANGE UP (ref 98–110)
CO2 SERPL-SCNC: 23 MMOL/L — SIGNIFICANT CHANGE UP (ref 17–32)
CREAT SERPL-MCNC: 1.2 MG/DL — SIGNIFICANT CHANGE UP (ref 0.7–1.5)
EOSINOPHIL # BLD AUTO: 0.07 K/UL — SIGNIFICANT CHANGE UP (ref 0–0.7)
EOSINOPHIL NFR BLD AUTO: 1.2 % — SIGNIFICANT CHANGE UP (ref 0–8)
GLUCOSE SERPL-MCNC: 68 MG/DL — LOW (ref 70–99)
HCT VFR BLD CALC: 39.8 % — SIGNIFICANT CHANGE UP (ref 37–47)
HGB BLD-MCNC: 12.4 G/DL — SIGNIFICANT CHANGE UP (ref 12–16)
IMM GRANULOCYTES NFR BLD AUTO: 0.4 % — HIGH (ref 0.1–0.3)
INR BLD: 0.95 RATIO — SIGNIFICANT CHANGE UP (ref 0.65–1.3)
LYMPHOCYTES # BLD AUTO: 2.47 K/UL — SIGNIFICANT CHANGE UP (ref 1.2–3.4)
LYMPHOCYTES # BLD AUTO: 43.8 % — SIGNIFICANT CHANGE UP (ref 20.5–51.1)
MCHC RBC-ENTMCNC: 30 PG — SIGNIFICANT CHANGE UP (ref 27–31)
MCHC RBC-ENTMCNC: 31.2 G/DL — LOW (ref 32–37)
MCV RBC AUTO: 96.4 FL — SIGNIFICANT CHANGE UP (ref 81–99)
MONOCYTES # BLD AUTO: 0.46 K/UL — SIGNIFICANT CHANGE UP (ref 0.1–0.6)
MONOCYTES NFR BLD AUTO: 8.2 % — SIGNIFICANT CHANGE UP (ref 1.7–9.3)
NEUTROPHILS # BLD AUTO: 2.59 K/UL — SIGNIFICANT CHANGE UP (ref 1.4–6.5)
NEUTROPHILS NFR BLD AUTO: 45.9 % — SIGNIFICANT CHANGE UP (ref 42.2–75.2)
NRBC # BLD: 0 /100 WBCS — SIGNIFICANT CHANGE UP (ref 0–0)
PLATELET # BLD AUTO: 235 K/UL — SIGNIFICANT CHANGE UP (ref 130–400)
POTASSIUM SERPL-MCNC: 4.8 MMOL/L — SIGNIFICANT CHANGE UP (ref 3.5–5)
POTASSIUM SERPL-SCNC: 4.8 MMOL/L — SIGNIFICANT CHANGE UP (ref 3.5–5)
PROT SERPL-MCNC: 7.3 G/DL — SIGNIFICANT CHANGE UP (ref 6–8)
PROTHROM AB SERPL-ACNC: 10.9 SEC — SIGNIFICANT CHANGE UP (ref 9.95–12.87)
RBC # BLD: 4.13 M/UL — LOW (ref 4.2–5.4)
RBC # FLD: 14 % — SIGNIFICANT CHANGE UP (ref 11.5–14.5)
SODIUM SERPL-SCNC: 146 MMOL/L — SIGNIFICANT CHANGE UP (ref 135–146)
WBC # BLD: 5.64 K/UL — SIGNIFICANT CHANGE UP (ref 4.8–10.8)
WBC # FLD AUTO: 5.64 K/UL — SIGNIFICANT CHANGE UP (ref 4.8–10.8)

## 2021-12-16 PROCEDURE — 71046 X-RAY EXAM CHEST 2 VIEWS: CPT | Mod: 26

## 2021-12-16 PROCEDURE — 93010 ELECTROCARDIOGRAM REPORT: CPT

## 2021-12-16 RX ORDER — DAPSONE 100 MG/1
0 TABLET ORAL
Qty: 0 | Refills: 0 | DISCHARGE

## 2021-12-16 RX ORDER — ATORVASTATIN CALCIUM 80 MG/1
0 TABLET, FILM COATED ORAL
Qty: 0 | Refills: 0 | DISCHARGE

## 2021-12-16 RX ORDER — DOLUTEGRAVIR SODIUM 25 MG/1
0 TABLET, FILM COATED ORAL
Qty: 0 | Refills: 0 | DISCHARGE

## 2021-12-16 NOTE — H&P PST ADULT - NSICDXPASTMEDICALHX_GEN_ALL_CORE_FT
PAST MEDICAL HISTORY:  COPD (chronic obstructive pulmonary disease)     CVA (cerebral vascular accident) 1997 RIGHT SIDED WEAKNESS    HIV (human immunodeficiency virus infection) DX 1997    HTN (hypertension)     Low grade squamous intraepithelial lesion on cytologic smear of anus (LGSIL)      PAST MEDICAL HISTORY:  COPD (chronic obstructive pulmonary disease)     CVA (cerebral vascular accident) 1997 RIGHT SIDED WEAKNESS    HIV (human immunodeficiency virus infection) DX 1997i eighr    HTN (hypertension)     Low grade squamous intraepithelial lesion on cytologic smear of anus (LGSIL)

## 2021-12-16 NOTE — H&P PST ADULT - HISTORY OF PRESENT ILLNESS
62 Y/O FEMALE PT TO PAST WITH HX  ANOGENITAL WARTS. PT C/O              PT NOW FOR SCHEDULED PROCEDURE ( EXCISION AND FULGURATION OF ANAL CONDYLOMA) . PT DENIES ANY CP SOB PALP COUGH DYSURIA FEVER URI. PT ABLE TO GEORGE 1-2 FOS W/O SOB  pt denies any covid s/s, or tested positive in the past  pt advised self quarantine till day of procedure  Anesthesia Alert  NO--Difficult Airway  NO--History of neck surgery or radiation  NO--Limited ROM of neck  NO--History of Malignant hyperthermia  NO--Personal or family history of Pseudocholinesterase deficiency.  NO--Prior Anesthesia Complication  NO--Latex Allergy  NO--Loose teeth  NO--History of Rheumatoid Arthritis  NO--MIA  NO--Bleeding risk  NO--Other_____     62 Y/O FEMALE PT TO PAST WITH HX  ANOGENITAL WARTS. PT C/O VENERAL WARTS FOR PAST 3-6 MO, DENIES ANY PAIN OR D/C, BLEEDING.    PT NOW FOR SCHEDULED PROCEDURE ( EXCISION AND FULGURATION OF ANAL CONDYLOMA) . PT DENIES ANY CP SOB PALP COUGH DYSURIA FEVER URI. PT ABLE TO GEORGE 1-2 FOS W/O SOB  pt denies any covid s/s, or tested positive in the past  pt advised self quarantine till day of procedure  Anesthesia Alert  NO--Difficult Airway  NO--History of neck surgery or radiation  NO--Limited ROM of neck  NO--History of Malignant hyperthermia  NO--Personal or family history of Pseudocholinesterase deficiency.  NO--Prior Anesthesia Complication  NO--Latex Allergy  NO--Loose teeth  NO--History of Rheumatoid Arthritis  NO--MIA  NO--Bleeding risk  NO--Other_____

## 2021-12-23 ENCOUNTER — OUTPATIENT (OUTPATIENT)
Dept: OUTPATIENT SERVICES | Facility: HOSPITAL | Age: 61
LOS: 1 days | Discharge: HOME | End: 2021-12-23

## 2021-12-23 ENCOUNTER — NON-APPOINTMENT (OUTPATIENT)
Age: 61
End: 2021-12-23

## 2021-12-23 DIAGNOSIS — Z98.890 OTHER SPECIFIED POSTPROCEDURAL STATES: Chronic | ICD-10-CM

## 2021-12-27 ENCOUNTER — OUTPATIENT (OUTPATIENT)
Dept: OUTPATIENT SERVICES | Facility: HOSPITAL | Age: 61
LOS: 1 days | Discharge: HOME | End: 2021-12-27

## 2021-12-27 ENCOUNTER — NON-APPOINTMENT (OUTPATIENT)
Age: 61
End: 2021-12-27

## 2021-12-27 ENCOUNTER — APPOINTMENT (OUTPATIENT)
Dept: INFECTIOUS DISEASE | Facility: CLINIC | Age: 61
End: 2021-12-27
Payer: COMMERCIAL

## 2021-12-27 DIAGNOSIS — I69.30 UNSPECIFIED SEQUELAE OF CEREBRAL INFARCTION: ICD-10-CM

## 2021-12-27 DIAGNOSIS — E78.5 HYPERLIPIDEMIA, UNSPECIFIED: ICD-10-CM

## 2021-12-27 DIAGNOSIS — N28.9 DISORDER OF KIDNEY AND URETER, UNSPECIFIED: ICD-10-CM

## 2021-12-27 DIAGNOSIS — I10 ESSENTIAL (PRIMARY) HYPERTENSION: ICD-10-CM

## 2021-12-27 DIAGNOSIS — Z98.890 OTHER SPECIFIED POSTPROCEDURAL STATES: Chronic | ICD-10-CM

## 2021-12-27 DIAGNOSIS — B20 HUMAN IMMUNODEFICIENCY VIRUS [HIV] DISEASE: ICD-10-CM

## 2021-12-27 PROCEDURE — ZZZZZ: CPT

## 2021-12-27 RX ORDER — OXYCODONE AND ACETAMINOPHEN 5; 325 MG/1; MG/1
5-325 TABLET ORAL
Qty: 16 | Refills: 0 | Status: DISCONTINUED | COMMUNITY
Start: 2021-11-19

## 2021-12-27 RX ORDER — DICLOFENAC SODIUM 1% 10 MG/G
1 GEL TOPICAL
Qty: 100 | Refills: 0 | Status: DISCONTINUED | COMMUNITY
Start: 2020-12-18

## 2021-12-27 RX ORDER — VARENICLINE TARTRATE 1 MG/1
1 TABLET, FILM COATED ORAL
Qty: 56 | Refills: 0 | Status: DISCONTINUED | COMMUNITY
Start: 2021-03-12

## 2021-12-27 NOTE — HISTORY OF PRESENT ILLNESS
[FreeTextEntry1] : Televisit done today for HIV F/U. She stated that her rectal surgery was rescheduled for 1/5/22 but she does not want to have it because she doesn't "feel well" today. She C/O sore throat without nasal congestion, fevers, SOB, myalgias but did report a nonproductive cough. I advised her to go to an Urgent Care to get tested for Covid 19. I also told her that she will not have the surgery if she doesn't feel well. Her BP this am was 183/81. She reported full compliance with the Metoprolol (increased dose) since it was prescribed on 12/16. She reported full compliance with all meds without any SE.  [Sexually Active] : The patient is not sexually active

## 2021-12-27 NOTE — ASSESSMENT
[FreeTextEntry1] : Stable HIV with with uncontrolled HTN, now C/O sore throat\par       Add Clonidine OD and continue to monitor closely\par       Pt told to get a Covid 19 test at an Urgent Care\par       Continue present meds; all meds were reviewed with patient today  [Treatment Education] : treatment education [Treatment Adherence] : treatment adherence [Rx Dose / Side Effects] : Rx dose/side effects [Nutritional / Food Issues] : nutritional/food issues [Medical Care Issues] : medical care issues [Drug Interactions / Side Effects] : drug interactions/side effects

## 2021-12-27 NOTE — REVIEW OF SYSTEMS
[As Noted in HPI] : as noted in HPI [Earache] : no earache [Loss Of Hearing] : no hearing loss [Nasal Discharge] : no nasal discharge [Sore Throat] : sore throat [Hoarseness] : no hoarseness [Negative] : Heme/Lymph [___ # of Missed Doses in The Past Week] : [unfilled] doses missed in the past week

## 2022-01-03 ENCOUNTER — NON-APPOINTMENT (OUTPATIENT)
Age: 62
End: 2022-01-03

## 2022-01-03 DIAGNOSIS — B20 HUMAN IMMUNODEFICIENCY VIRUS [HIV] DISEASE: ICD-10-CM

## 2022-01-04 ENCOUNTER — NON-APPOINTMENT (OUTPATIENT)
Age: 62
End: 2022-01-04

## 2022-01-06 DIAGNOSIS — B20 HUMAN IMMUNODEFICIENCY VIRUS [HIV] DISEASE: ICD-10-CM

## 2022-01-19 ENCOUNTER — APPOINTMENT (OUTPATIENT)
Dept: SURGERY | Facility: CLINIC | Age: 62
End: 2022-01-19

## 2022-01-27 ENCOUNTER — APPOINTMENT (OUTPATIENT)
Dept: INFECTIOUS DISEASE | Facility: CLINIC | Age: 62
End: 2022-01-27
Payer: COMMERCIAL

## 2022-01-27 ENCOUNTER — OUTPATIENT (OUTPATIENT)
Dept: OUTPATIENT SERVICES | Facility: HOSPITAL | Age: 62
LOS: 1 days | Discharge: HOME | End: 2022-01-27

## 2022-01-27 DIAGNOSIS — E78.5 HYPERLIPIDEMIA, UNSPECIFIED: ICD-10-CM

## 2022-01-27 DIAGNOSIS — N28.9 DISORDER OF KIDNEY AND URETER, UNSPECIFIED: ICD-10-CM

## 2022-01-27 DIAGNOSIS — J02.9 ACUTE PHARYNGITIS, UNSPECIFIED: ICD-10-CM

## 2022-01-27 DIAGNOSIS — Z98.890 OTHER SPECIFIED POSTPROCEDURAL STATES: Chronic | ICD-10-CM

## 2022-01-27 DIAGNOSIS — B20 HUMAN IMMUNODEFICIENCY VIRUS [HIV] DISEASE: ICD-10-CM

## 2022-01-27 DIAGNOSIS — I69.30 UNSPECIFIED SEQUELAE OF CEREBRAL INFARCTION: ICD-10-CM

## 2022-01-27 DIAGNOSIS — I10 ESSENTIAL (PRIMARY) HYPERTENSION: ICD-10-CM

## 2022-01-27 PROCEDURE — ZZZZZ: CPT

## 2022-01-27 NOTE — REASON FOR VISIT
[Home] : at home, [unfilled] , at the time of the visit. [Medical Office: ([facility])___] : at the medical office located in  [Verbal consent obtained from patient] : the patient, [unfilled] [Follow-Up: _____] : a [unfilled] follow-up visit

## 2022-01-27 NOTE — HISTORY OF PRESENT ILLNESS
[FreeTextEntry1] : Televisit done today for HIV F/U. She canceled her rectal procedure earlier this month because she wasn't feeling well. She now C/O sore throat with nonproductive cough x 2 days. She denied fevers, myalgias, SOB, nasal congestion, or other associated complaints. She has not gone for a Covid 19 test yet. BPs were reviewed and yesterday's and today's readings were normal but prior to that, she still had some high readings > 90 diastolic. She stated that she has been fully compliant with all meds without any SE.  [Sexually Active] : The patient is not sexually active

## 2022-01-27 NOTE — ASSESSMENT
[FreeTextEntry1] : Stable HIV with HTN, sore throat\par      I told patient to get tested for Covid 19 and stay isolated at home until she is better.\par      Supportive RX\par      Continue to monitor BPs q day, record and bring with her to clinic next month.\par      RTC in 4 weeks at around 9 am\par      Pt wanted to hold off on rescheduled rectal surgery at this time; will discuss further at next visit. [Treatment Education] : treatment education [Treatment Adherence] : treatment adherence [Rx Dose / Side Effects] : Rx dose/side effects [Medical Care Issues] : medical care issues [Drug Interactions / Side Effects] : drug interactions/side effects

## 2022-01-27 NOTE — REVIEW OF SYSTEMS
[Earache] : no earache [Loss Of Hearing] : no hearing loss [Nasal Discharge] : no nasal discharge [Sore Throat] : sore throat [Hoarseness] : no hoarseness [As Noted in HPI] : as noted in HPI [Shortness Of Breath] : no shortness of breath [Wheezing] : no wheezing [Cough] : cough [SOB on Exertion] : no shortness of breath during exertion [Sputum] : not coughing up ~M sputum [Pleuritic Chest Pain] : no pleuritic chest pain [Negative] : Heme/Lymph [___ # of Missed Doses in The Past Week] : [unfilled] doses missed in the past week

## 2022-02-10 ENCOUNTER — APPOINTMENT (OUTPATIENT)
Dept: OBGYN | Facility: CLINIC | Age: 62
End: 2022-02-10

## 2022-02-24 ENCOUNTER — NON-APPOINTMENT (OUTPATIENT)
Age: 62
End: 2022-02-24

## 2022-03-03 ENCOUNTER — LABORATORY RESULT (OUTPATIENT)
Age: 62
End: 2022-03-03

## 2022-03-03 ENCOUNTER — OUTPATIENT (OUTPATIENT)
Dept: OUTPATIENT SERVICES | Facility: HOSPITAL | Age: 62
LOS: 1 days | Discharge: HOME | End: 2022-03-03

## 2022-03-03 ENCOUNTER — MED ADMIN CHARGE (OUTPATIENT)
Age: 62
End: 2022-03-03

## 2022-03-03 ENCOUNTER — APPOINTMENT (OUTPATIENT)
Dept: INFECTIOUS DISEASE | Facility: CLINIC | Age: 62
End: 2022-03-03
Payer: MEDICAID

## 2022-03-03 VITALS
OXYGEN SATURATION: 98 % | BODY MASS INDEX: 23.61 KG/M2 | HEART RATE: 98 BPM | HEIGHT: 64.5 IN | WEIGHT: 140 LBS | RESPIRATION RATE: 14 BRPM | DIASTOLIC BLOOD PRESSURE: 84 MMHG | TEMPERATURE: 98.2 F | SYSTOLIC BLOOD PRESSURE: 156 MMHG

## 2022-03-03 DIAGNOSIS — Z98.890 OTHER SPECIFIED POSTPROCEDURAL STATES: Chronic | ICD-10-CM

## 2022-03-03 DIAGNOSIS — B20 HUMAN IMMUNODEFICIENCY VIRUS [HIV] DISEASE: ICD-10-CM

## 2022-03-03 DIAGNOSIS — E78.5 HYPERLIPIDEMIA, UNSPECIFIED: ICD-10-CM

## 2022-03-03 DIAGNOSIS — I10 ESSENTIAL (PRIMARY) HYPERTENSION: ICD-10-CM

## 2022-03-03 DIAGNOSIS — I69.30 UNSPECIFIED SEQUELAE OF CEREBRAL INFARCTION: ICD-10-CM

## 2022-03-03 DIAGNOSIS — Z23 ENCOUNTER FOR IMMUNIZATION: ICD-10-CM

## 2022-03-03 PROCEDURE — 99214 OFFICE O/P EST MOD 30 MIN: CPT

## 2022-03-03 RX ORDER — CLONIDINE HYDROCHLORIDE 0.1 MG/1
0.1 TABLET ORAL
Qty: 30 | Refills: 1 | Status: DISCONTINUED | COMMUNITY
Start: 2021-12-27 | End: 2022-03-03

## 2022-03-03 NOTE — PHYSICAL EXAM
[General Appearance - Alert] : alert [General Appearance - In No Acute Distress] : in no acute distress [General Appearance - Well Nourished] : well nourished [Sclera] : the sclera and conjunctiva were normal [PERRL With Normal Accommodation] : pupils were equal in size, round, reactive to light [Extraocular Movements] : extraocular movements were intact [Outer Ear] : the ears and nose were normal in appearance [Oropharynx] : the oropharynx was normal with no thrush [Neck Appearance] : the appearance of the neck was normal [Neck Cervical Mass (___cm)] : no neck mass was observed [Jugular Venous Distention Increased] : there was no jugular-venous distention [Thyroid Diffuse Enlargement] : the thyroid was not enlarged [Auscultation Breath Sounds / Voice Sounds] : lungs were clear to auscultation bilaterally [Heart Rate And Rhythm] : heart rate was normal and rhythm regular [Heart Sounds] : normal S1 and S2 [Heart Sounds Gallop] : no gallops [Murmurs] : no murmurs [Heart Sounds Pericardial Friction Rub] : no pericardial rub [Full Pulse] : the pedal pulses are present [Edema] : there was no peripheral edema [Bowel Sounds] : normal bowel sounds [Abdomen Soft] : soft [Abdomen Tenderness] : non-tender [Abdomen Mass (___ Cm)] : no abdominal mass palpated [Costovertebral Angle Tenderness] : no CVA tenderness [No Palpable Adenopathy] : no palpable adenopathy [Musculoskeletal - Swelling] : no joint swelling [Nail Clubbing] : no clubbing  or cyanosis of the fingernails [Motor Tone] : muscle strength and tone were normal [Skin Color & Pigmentation] : normal skin color and pigmentation [] : no rash [Deep Tendon Reflexes (DTR)] : deep tendon reflexes were 2+ and symmetric [FreeTextEntry1] : Chronic Right sided weakness due to CVA [Oriented To Time, Place, And Person] : oriented to person, place, and time [Affect] : the affect was normal

## 2022-03-03 NOTE — ASSESSMENT
[FreeTextEntry1] : Stable HIV with Uncontrolled HTN\par      Annual labs today\par      Increase Clonidine to 0.2 mg q day\par      Pt instructed to check BPs q day and record and bring with her to next appt to review\par      RTC in 4 weeks\par      Will refer back to Rectal Surgeon if HIV stable and when HTN well controlled. [Treatment Education] : treatment education [Treatment Adherence] : treatment adherence [Rx Dose / Side Effects] : Rx dose/side effects [Nutritional / Food Issues] : nutritional/food issues [Medical Care Issues] : medical care issues [Drug Interactions / Side Effects] : drug interactions/side effects [Smoking Cessation] : smoking cessation

## 2022-03-03 NOTE — HISTORY OF PRESENT ILLNESS
[FreeTextEntry1] : Patient here for HIV F/U with Annual labs, exam, and RXs. She had no present complaints. I reviewed her BPs with her but she didn't bring the readings with her. She just said that they were "high". She reported full compliance with all meds without any SE. She still wants to hold off on rescheduling her surgery and I agreed that we need to get her BP down first. She needs a new HHA since she has not had one x 2 months but she stated that she is changing her Insurance to [employer] which is not active yet so I will wait until her new Insurance is active until I complete and submit the M11Q form.  [Sexually Active] : The patient is not sexually active

## 2022-03-04 ENCOUNTER — NON-APPOINTMENT (OUTPATIENT)
Age: 62
End: 2022-03-04

## 2022-03-07 LAB
25(OH)D3 SERPL-MCNC: 11 NG/ML
ALBUMIN SERPL ELPH-MCNC: 4.4 G/DL
ALP BLD-CCNC: 146 U/L
ALT SERPL-CCNC: <5 U/L
ANION GAP SERPL CALC-SCNC: 13 MMOL/L
APPEARANCE: CLEAR
AST SERPL-CCNC: 13 U/L
BASOPHILS # BLD AUTO: 0.03 K/UL
BASOPHILS NFR BLD AUTO: 0.6 %
BILIRUB SERPL-MCNC: 0.4 MG/DL
BILIRUBIN URINE: NEGATIVE
BLOOD URINE: NEGATIVE
BUN SERPL-MCNC: 13 MG/DL
C TRACH RRNA SPEC QL NAA+PROBE: NOT DETECTED
CALCIUM SERPL-MCNC: 9.9 MG/DL
CD16+CD56+ CELLS # BLD: 131 /UL
CD16+CD56+ CELLS NFR BLD: 8 %
CD19 CELLS NFR BLD: 422 /UL
CD3 CELLS # BLD: 1123 /UL
CD3 CELLS NFR BLD: 66 %
CD3+CD4+ CELLS # BLD: 325 /UL
CD3+CD4+ CELLS NFR BLD: 19 %
CD3+CD4+ CELLS/CD3+CD8+ CLL SPEC: 0.46 RATIO
CD3+CD8+ CELLS # SPEC: 701 /UL
CD3+CD8+ CELLS NFR BLD: 41 %
CELLS.CD3-CD19+/CELLS IN BLOOD: 25 %
CHLORIDE SERPL-SCNC: 105 MMOL/L
CHOLEST SERPL-MCNC: 201 MG/DL
CO2 SERPL-SCNC: 22 MMOL/L
COLOR: NORMAL
CREAT SERPL-MCNC: 1.1 MG/DL
EGFR: 58 ML/MIN/1.73M2
EOSINOPHIL # BLD AUTO: 0.05 K/UL
EOSINOPHIL NFR BLD AUTO: 1.1 %
ESTIMATED AVERAGE GLUCOSE: 103 MG/DL
GGT SERPL-CCNC: 18 U/L
GLUCOSE QUALITATIVE U: NEGATIVE
GLUCOSE SERPL-MCNC: 86 MG/DL
HBA1C MFR BLD HPLC: 5.2 %
HCT VFR BLD CALC: 37.5 %
HCV AB SER QL: NONREACTIVE
HCV S/CO RATIO: 0.1 S/CO
HDLC SERPL-MCNC: 57 MG/DL
HGB BLD-MCNC: 11.8 G/DL
HIV1 RNA # SERPL NAA+PROBE: 32 COPIES/ML
IMM GRANULOCYTES NFR BLD AUTO: 0.2 %
KETONES URINE: NEGATIVE
LDH SERPL-CCNC: 159
LDLC SERPL CALC-MCNC: 123 MG/DL
LEUKOCYTE ESTERASE URINE: NEGATIVE
LYMPHOCYTES # BLD AUTO: 1.92 K/UL
LYMPHOCYTES NFR BLD AUTO: 41.1 %
MAN DIFF?: NORMAL
MCHC RBC-ENTMCNC: 29.5 PG
MCHC RBC-ENTMCNC: 31.5 G/DL
MCV RBC AUTO: 93.8 FL
MONOCYTES # BLD AUTO: 0.33 K/UL
MONOCYTES NFR BLD AUTO: 7.1 %
N GONORRHOEA RRNA SPEC QL NAA+PROBE: NOT DETECTED
NEUTROPHILS # BLD AUTO: 2.33 K/UL
NEUTROPHILS NFR BLD AUTO: 49.9 %
NITRITE URINE: NEGATIVE
NONHDLC SERPL-MCNC: 144 MG/DL
PH URINE: 6
PLATELET # BLD AUTO: 295 K/UL
POTASSIUM SERPL-SCNC: 4.6 MMOL/L
PROT SERPL-MCNC: 7.7 G/DL
PROTEIN URINE: NEGATIVE
RBC # BLD: 4 M/UL
RBC # FLD: 14.2 %
SIUH URINE DRUG SCREEN 1: NORMAL
SODIUM SERPL-SCNC: 140 MMOL/L
SOURCE AMPLIFICATION: NORMAL
SPECIFIC GRAVITY URINE: 1.01
T PALLIDUM AB SER QL IA: NEGATIVE
TRIGL SERPL-MCNC: 110 MG/DL
TSH SERPL-ACNC: 0.9 UIU/ML
UROBILINOGEN URINE: NORMAL
VIRAL LOAD INTERP: DETECTED COPIES/ML
VIRAL LOAD LOG: 1.5 LOGCOPIES/ML
WBC # FLD AUTO: 4.67 K/UL

## 2022-03-09 DIAGNOSIS — B20 HUMAN IMMUNODEFICIENCY VIRUS [HIV] DISEASE: ICD-10-CM

## 2022-03-10 LAB
M TB IFN-G BLD-IMP: NEGATIVE
QUANTIFERON TB PLUS MITOGEN MINUS NIL: 9.9 IU/ML
QUANTIFERON TB PLUS NIL: 0.05 IU/ML
QUANTIFERON TB PLUS TB1 MINUS NIL: -0.01 IU/ML
QUANTIFERON TB PLUS TB2 MINUS NIL: -0.01 IU/ML

## 2022-03-11 ENCOUNTER — OUTPATIENT (OUTPATIENT)
Dept: OUTPATIENT SERVICES | Facility: HOSPITAL | Age: 62
LOS: 1 days | Discharge: HOME | End: 2022-03-11

## 2022-03-11 DIAGNOSIS — Z98.890 OTHER SPECIFIED POSTPROCEDURAL STATES: Chronic | ICD-10-CM

## 2022-03-14 ENCOUNTER — OUTPATIENT (OUTPATIENT)
Dept: OUTPATIENT SERVICES | Facility: HOSPITAL | Age: 62
LOS: 1 days | Discharge: HOME | End: 2022-03-14

## 2022-03-14 ENCOUNTER — NON-APPOINTMENT (OUTPATIENT)
Age: 62
End: 2022-03-14

## 2022-03-14 DIAGNOSIS — Z98.890 OTHER SPECIFIED POSTPROCEDURAL STATES: Chronic | ICD-10-CM

## 2022-03-18 DIAGNOSIS — B20 HUMAN IMMUNODEFICIENCY VIRUS [HIV] DISEASE: ICD-10-CM

## 2022-03-25 DIAGNOSIS — B20 HUMAN IMMUNODEFICIENCY VIRUS [HIV] DISEASE: ICD-10-CM

## 2022-03-31 ENCOUNTER — OUTPATIENT (OUTPATIENT)
Dept: OUTPATIENT SERVICES | Facility: HOSPITAL | Age: 62
LOS: 1 days | Discharge: HOME | End: 2022-03-31

## 2022-03-31 ENCOUNTER — MED ADMIN CHARGE (OUTPATIENT)
Age: 62
End: 2022-03-31

## 2022-03-31 ENCOUNTER — APPOINTMENT (OUTPATIENT)
Dept: INFECTIOUS DISEASE | Facility: CLINIC | Age: 62
End: 2022-03-31
Payer: MEDICAID

## 2022-03-31 VITALS
SYSTOLIC BLOOD PRESSURE: 150 MMHG | HEIGHT: 64 IN | TEMPERATURE: 97.8 F | BODY MASS INDEX: 23.9 KG/M2 | DIASTOLIC BLOOD PRESSURE: 80 MMHG | HEART RATE: 62 BPM | RESPIRATION RATE: 16 BRPM | WEIGHT: 140 LBS

## 2022-03-31 DIAGNOSIS — Z98.890 OTHER SPECIFIED POSTPROCEDURAL STATES: Chronic | ICD-10-CM

## 2022-03-31 DIAGNOSIS — N18.9 CHRONIC KIDNEY DISEASE, UNSPECIFIED: ICD-10-CM

## 2022-03-31 DIAGNOSIS — Z23 ENCOUNTER FOR IMMUNIZATION: ICD-10-CM

## 2022-03-31 DIAGNOSIS — B20 HUMAN IMMUNODEFICIENCY VIRUS [HIV] DISEASE: ICD-10-CM

## 2022-03-31 DIAGNOSIS — E78.5 HYPERLIPIDEMIA, UNSPECIFIED: ICD-10-CM

## 2022-03-31 DIAGNOSIS — E55.9 VITAMIN D DEFICIENCY, UNSPECIFIED: ICD-10-CM

## 2022-03-31 DIAGNOSIS — I10 ESSENTIAL (PRIMARY) HYPERTENSION: ICD-10-CM

## 2022-03-31 DIAGNOSIS — I69.30 UNSPECIFIED SEQUELAE OF CEREBRAL INFARCTION: ICD-10-CM

## 2022-03-31 PROCEDURE — 99214 OFFICE O/P EST MOD 30 MIN: CPT

## 2022-03-31 NOTE — HISTORY OF PRESENT ILLNESS
[FreeTextEntry1] : Patient here for HIV F/U with lab results, exam, and RXs. She went to the ED 2 days ago ([facility]) for Cough and SOB and was diagnosed with COPD exacerbation. She was prescribed 4 days of Prednisone and Azithromycin with improvement in symptoms. She reported full compliance with HAART but admitted to missing doses of Metoprolol and Atorvastatin. She denied any SE from the medication. Her systolic BP is high today; she has been checking her BPs at home q day but she forgot to bring the record of her BPs today.  [Sexually Active] : The patient is not sexually active

## 2022-03-31 NOTE — REVIEW OF SYSTEMS
[As Noted in HPI] : as noted in HPI [Shortness Of Breath] : shortness of breath [Wheezing] : no wheezing [Cough] : cough [SOB on Exertion] : shortness of breath during exertion [Sputum] : not coughing up ~M sputum [Pleuritic Chest Pain] : no pleuritic chest pain [Negative] : Heme/Lymph [___ # of Missed Doses in The Past Week] : [unfilled] doses missed in the past week

## 2022-03-31 NOTE — PHYSICAL EXAM
[General Appearance - Alert] : alert [General Appearance - In No Acute Distress] : in no acute distress [General Appearance - Well Nourished] : well nourished [Sclera] : the sclera and conjunctiva were normal [PERRL With Normal Accommodation] : pupils were equal in size, round, reactive to light [Extraocular Movements] : extraocular movements were intact [Outer Ear] : the ears and nose were normal in appearance [Oropharynx] : the oropharynx was normal with no thrush [Neck Appearance] : the appearance of the neck was normal [Neck Cervical Mass (___cm)] : no neck mass was observed [Jugular Venous Distention Increased] : there was no jugular-venous distention [Thyroid Diffuse Enlargement] : the thyroid was not enlarged [Auscultation Breath Sounds / Voice Sounds] : lungs were clear to auscultation bilaterally [Heart Rate And Rhythm] : heart rate was normal and rhythm regular [Heart Sounds] : normal S1 and S2 [Heart Sounds Gallop] : no gallops [Murmurs] : no murmurs [Heart Sounds Pericardial Friction Rub] : no pericardial rub [Full Pulse] : the pedal pulses are present [Edema] : there was no peripheral edema [Bowel Sounds] : normal bowel sounds [Abdomen Soft] : soft [Abdomen Tenderness] : non-tender [Abdomen Mass (___ Cm)] : no abdominal mass palpated [Costovertebral Angle Tenderness] : no CVA tenderness [No Palpable Adenopathy] : no palpable adenopathy [Musculoskeletal - Swelling] : no joint swelling [Nail Clubbing] : no clubbing  or cyanosis of the fingernails [Motor Tone] : muscle strength and tone were normal [Skin Color & Pigmentation] : normal skin color and pigmentation [] : no rash [FreeTextEntry1] : Weakness of Right upper and Right lower extremities [Oriented To Time, Place, And Person] : oriented to person, place, and time [Affect] : the affect was normal

## 2022-03-31 NOTE — ASSESSMENT
[FreeTextEntry1] : Stable HIV\par              Continue present meds\par               Repeat labs in early July\par HTN, not well controlled\par               Continue present meds; all meds were reviewed with her today\par               I educated her on the importance of taking the Metoprolol q day\par               Pt instructed to check her BPs q day and record\par               TTM in 4 weeks to re-evaluate BP\par Hyperlipidemia, not well controlled due to poor compliance\par               Pt instructed to take Lipitor q day and educated on the importance of full compliance with it.\par Vit D Deficiency\par               Pt instructed to take the Vit D q week\par                [Treatment Education] : treatment education [Treatment Adherence] : treatment adherence [Rx Dose / Side Effects] : Rx dose/side effects [Nutritional / Food Issues] : nutritional/food issues [Medical Care Issues] : medical care issues [Drug Interactions / Side Effects] : drug interactions/side effects [Smoking Cessation] : smoking cessation

## 2022-04-04 ENCOUNTER — NON-APPOINTMENT (OUTPATIENT)
Age: 62
End: 2022-04-04

## 2022-04-04 ENCOUNTER — OUTPATIENT (OUTPATIENT)
Dept: OUTPATIENT SERVICES | Facility: HOSPITAL | Age: 62
LOS: 1 days | Discharge: HOME | End: 2022-04-04

## 2022-04-04 DIAGNOSIS — B20 HUMAN IMMUNODEFICIENCY VIRUS [HIV] DISEASE: ICD-10-CM

## 2022-04-04 DIAGNOSIS — Z98.890 OTHER SPECIFIED POSTPROCEDURAL STATES: Chronic | ICD-10-CM

## 2022-04-05 ENCOUNTER — NON-APPOINTMENT (OUTPATIENT)
Age: 62
End: 2022-04-05

## 2022-04-08 DIAGNOSIS — B20 HUMAN IMMUNODEFICIENCY VIRUS [HIV] DISEASE: ICD-10-CM

## 2022-04-28 ENCOUNTER — APPOINTMENT (OUTPATIENT)
Dept: INFECTIOUS DISEASE | Facility: CLINIC | Age: 62
End: 2022-04-28
Payer: COMMERCIAL

## 2022-04-28 ENCOUNTER — OUTPATIENT (OUTPATIENT)
Dept: OUTPATIENT SERVICES | Facility: HOSPITAL | Age: 62
LOS: 1 days | Discharge: HOME | End: 2022-04-28

## 2022-04-28 DIAGNOSIS — Z98.890 OTHER SPECIFIED POSTPROCEDURAL STATES: Chronic | ICD-10-CM

## 2022-04-28 DIAGNOSIS — B20 HUMAN IMMUNODEFICIENCY VIRUS [HIV] DISEASE: ICD-10-CM

## 2022-04-28 DIAGNOSIS — I10 ESSENTIAL (PRIMARY) HYPERTENSION: ICD-10-CM

## 2022-04-28 DIAGNOSIS — N18.9 CHRONIC KIDNEY DISEASE, UNSPECIFIED: ICD-10-CM

## 2022-04-28 DIAGNOSIS — E78.5 HYPERLIPIDEMIA, UNSPECIFIED: ICD-10-CM

## 2022-04-28 PROCEDURE — ZZZZZ: CPT

## 2022-04-28 NOTE — ASSESSMENT
[FreeTextEntry1] : Stable HIV with Increased Systolic BPs, Increased Anxiety due to present poor living conditions\par                         F/U with PH for Housing Assistance\par                         Continue present meds; all meds were reviewed with onel today\par                         Continue to check her BPs q day, record, and will review next visit\par                         RTC on 6/2 or 6/9 in the am for routine labs,etc\par                         Will hold off on referring back to Rehab for PT and OT after she moves.\par \par   [Treatment Education] : treatment education [Treatment Adherence] : treatment adherence [Rx Dose / Side Effects] : Rx dose/side effects [Nutritional / Food Issues] : nutritional/food issues [Medical Care Issues] : medical care issues [Drug Interactions / Side Effects] : drug interactions/side effects

## 2022-04-28 NOTE — REVIEW OF SYSTEMS
[As Noted in HPI] : as noted in HPI [Suicidal] : not suicidal [Anxiety] : anxiety [Depression] : depression [Negative] : Heme/Lymph [___ # of Missed Doses in The Past Week] : [unfilled] doses missed in the past week  [de-identified] : Only regarding present living situation, no recent suicidal ideations/plan, or recent attempts

## 2022-04-28 NOTE — REASON FOR VISIT
[Home] : at home, [unfilled] , at the time of the visit. [Medical Office: ([facility])___] : at the medical office located in  [Verbal consent obtained from patient] : the patient, [unfilled] [Follow-Up: _____] : a [unfilled] follow-up visit

## 2022-04-28 NOTE — HISTORY OF PRESENT ILLNESS
[FreeTextEntry1] : Televisit done for HIV F/U. She reported really depressed and stressed out because there are rats in her apartment so she is trying to move ASAP. She has a  at  who is helping her with this. She had no physical complaints. She was going to PT and OT but she was having problems with her rides so they stopped the PT and OT. She reported checking her BPs regularly; was 158/75 yesterday and 131/68 today. Systolic may be elevated due to increased anxiety.  She is no longer with [facility]. She reported full compliance with all meds without any SE.  [Sexually Active] : The patient is not sexually active

## 2022-05-25 NOTE — ED ADULT NURSE NOTE - PMH
CVA (cerebral vascular accident)    HIV (human immunodeficiency virus infection)
[facility])  Geriatric Medicine; Internal Medicine  [phone] [place] [address]  Phone: [phone]  Fax: [phone]  Follow Up Time: Routine

## 2022-06-09 ENCOUNTER — APPOINTMENT (OUTPATIENT)
Dept: INFECTIOUS DISEASE | Facility: CLINIC | Age: 62
End: 2022-06-09
Payer: COMMERCIAL

## 2022-06-09 ENCOUNTER — OUTPATIENT (OUTPATIENT)
Dept: OUTPATIENT SERVICES | Facility: HOSPITAL | Age: 62
LOS: 1 days | Discharge: HOME | End: 2022-06-09

## 2022-06-09 VITALS
RESPIRATION RATE: 16 BRPM | TEMPERATURE: 98.7 F | WEIGHT: 137 LBS | HEIGHT: 64 IN | HEART RATE: 60 BPM | DIASTOLIC BLOOD PRESSURE: 74 MMHG | BODY MASS INDEX: 23.39 KG/M2 | SYSTOLIC BLOOD PRESSURE: 146 MMHG

## 2022-06-09 DIAGNOSIS — I10 ESSENTIAL (PRIMARY) HYPERTENSION: ICD-10-CM

## 2022-06-09 DIAGNOSIS — N18.9 CHRONIC KIDNEY DISEASE, UNSPECIFIED: ICD-10-CM

## 2022-06-09 DIAGNOSIS — I69.30 UNSPECIFIED SEQUELAE OF CEREBRAL INFARCTION: ICD-10-CM

## 2022-06-09 DIAGNOSIS — E78.5 HYPERLIPIDEMIA, UNSPECIFIED: ICD-10-CM

## 2022-06-09 DIAGNOSIS — B20 HUMAN IMMUNODEFICIENCY VIRUS [HIV] DISEASE: ICD-10-CM

## 2022-06-09 DIAGNOSIS — Z98.890 OTHER SPECIFIED POSTPROCEDURAL STATES: Chronic | ICD-10-CM

## 2022-06-09 PROCEDURE — 99214 OFFICE O/P EST MOD 30 MIN: CPT

## 2022-06-09 NOTE — PHYSICAL EXAM
[General Appearance - Alert] : alert [General Appearance - In No Acute Distress] : in no acute distress [Sclera] : the sclera and conjunctiva were normal [PERRL With Normal Accommodation] : pupils were equal in size, round, reactive to light [Extraocular Movements] : extraocular movements were intact [Outer Ear] : the ears and nose were normal in appearance [Oropharynx] : the oropharynx was normal with no thrush [Neck Appearance] : the appearance of the neck was normal [Neck Cervical Mass (___cm)] : no neck mass was observed [Jugular Venous Distention Increased] : there was no jugular-venous distention [Thyroid Diffuse Enlargement] : the thyroid was not enlarged [Auscultation Breath Sounds / Voice Sounds] : lungs were clear to auscultation bilaterally [Heart Rate And Rhythm] : heart rate was normal and rhythm regular [Heart Sounds] : normal S1 and S2 [Heart Sounds Gallop] : no gallops [Murmurs] : no murmurs [Heart Sounds Pericardial Friction Rub] : no pericardial rub [Full Pulse] : the pedal pulses are present [Edema] : there was no peripheral edema [Bowel Sounds] : normal bowel sounds [Abdomen Soft] : soft [Abdomen Tenderness] : non-tender [Abdomen Mass (___ Cm)] : no abdominal mass palpated [Costovertebral Angle Tenderness] : no CVA tenderness [No Palpable Adenopathy] : no palpable adenopathy [Musculoskeletal - Swelling] : no joint swelling [Nail Clubbing] : no clubbing  or cyanosis of the fingernails [Motor Tone] : muscle strength and tone were normal [Skin Color & Pigmentation] : normal skin color and pigmentation [] : no rash [FreeTextEntry1] : Chronic Right sided weakness due to CVA, walking with a walker [Oriented To Time, Place, And Person] : oriented to person, place, and time [Affect] : the affect was normal

## 2022-06-09 NOTE — HISTORY OF PRESENT ILLNESS
[FreeTextEntry1] : Patient here for HIV F/U with Routine labs, exam, and RXs. She had no physical complaints. She was upset because she has rats in her apartment and has been trying to move. She stated that the current home environment has been really stressing her out with increased blood pressure. She reported full compliance with all meds without any SE.  [Sexually Active] : The patient is not sexually active

## 2022-06-09 NOTE — ASSESSMENT
[FreeTextEntry1] : Stable HIV\par                         Routine labs today\par                         Continue present meds\par                         TTM in 4 weeks for all results\par Increased Systolic BP today, (?due to increased stress at home) home BPs were reviewed and were overall normal\par                          Continue to check BPs at home\par                           F/U with PH to help with Housing [Treatment Education] : treatment education [Treatment Adherence] : treatment adherence [Rx Dose / Side Effects] : Rx dose/side effects [Nutritional / Food Issues] : nutritional/food issues [Medical Care Issues] : medical care issues [Drug Interactions / Side Effects] : drug interactions/side effects [Smoking Cessation] : smoking cessation

## 2022-06-13 LAB
ALBUMIN SERPL ELPH-MCNC: 4.7 G/DL
ALP BLD-CCNC: 161 U/L
ALT SERPL-CCNC: 8 U/L
ANION GAP SERPL CALC-SCNC: 13 MMOL/L
AST SERPL-CCNC: 17 U/L
BASOPHILS # BLD AUTO: 0.03 K/UL
BASOPHILS NFR BLD AUTO: 0.7 %
BILIRUB SERPL-MCNC: 0.4 MG/DL
BUN SERPL-MCNC: 11 MG/DL
CALCIUM SERPL-MCNC: 9.8 MG/DL
CD16+CD56+ CELLS # BLD: 94 /UL
CD16+CD56+ CELLS NFR BLD: 10 %
CD19 CELLS NFR BLD: 193 /UL
CD3 CELLS # BLD: 606 /UL
CD3 CELLS NFR BLD: 67 %
CD3+CD4+ CELLS # BLD: 150 /UL
CD3+CD4+ CELLS NFR BLD: 17 %
CD3+CD4+ CELLS/CD3+CD8+ CLL SPEC: 0.36 RATIO
CD3+CD8+ CELLS # SPEC: 411 /UL
CD3+CD8+ CELLS NFR BLD: 46 %
CELLS.CD3-CD19+/CELLS IN BLOOD: 21 %
CHLORIDE SERPL-SCNC: 102 MMOL/L
CHOLEST SERPL-MCNC: 201 MG/DL
CO2 SERPL-SCNC: 23 MMOL/L
CREAT SERPL-MCNC: 1.2 MG/DL
EGFR: 52 ML/MIN/1.73M2
EOSINOPHIL # BLD AUTO: 0.12 K/UL
EOSINOPHIL NFR BLD AUTO: 2.7 %
ESTIMATED AVERAGE GLUCOSE: 100 MG/DL
GLUCOSE SERPL-MCNC: 77 MG/DL
HBA1C MFR BLD HPLC: 5.1 %
HCT VFR BLD CALC: 41.8 %
HDLC SERPL-MCNC: 56 MG/DL
HGB BLD-MCNC: 13 G/DL
HIV1 RNA # SERPL NAA+PROBE: 120 COPIES/ML
IMM GRANULOCYTES NFR BLD AUTO: 0.5 %
LDLC SERPL CALC-MCNC: 109 MG/DL
LYMPHOCYTES # BLD AUTO: 1.93 K/UL
LYMPHOCYTES NFR BLD AUTO: 43.9 %
MAN DIFF?: NORMAL
MCHC RBC-ENTMCNC: 29.3 PG
MCHC RBC-ENTMCNC: 31.1 G/DL
MCV RBC AUTO: 94.1 FL
MONOCYTES # BLD AUTO: 0.37 K/UL
MONOCYTES NFR BLD AUTO: 8.4 %
NEUTROPHILS # BLD AUTO: 1.93 K/UL
NEUTROPHILS NFR BLD AUTO: 43.8 %
NONHDLC SERPL-MCNC: 145 MG/DL
PLATELET # BLD AUTO: 162 K/UL
POTASSIUM SERPL-SCNC: 5.1 MMOL/L
PROT SERPL-MCNC: 7.9 G/DL
RBC # BLD: 4.44 M/UL
RBC # FLD: 14.9 %
SODIUM SERPL-SCNC: 138 MMOL/L
TRIGL SERPL-MCNC: 182 MG/DL
VIRAL LOAD INTERP: DETECTED COPIES/ML
VIRAL LOAD LOG: 2.08 LOGCOPIES/ML
WBC # FLD AUTO: 4.4 K/UL

## 2022-06-27 ENCOUNTER — APPOINTMENT (OUTPATIENT)
Dept: PAIN MANAGEMENT | Facility: CLINIC | Age: 62
End: 2022-06-27
Payer: COMMERCIAL

## 2022-06-27 VITALS
DIASTOLIC BLOOD PRESSURE: 99 MMHG | HEIGHT: 64 IN | WEIGHT: 137 LBS | HEART RATE: 65 BPM | SYSTOLIC BLOOD PRESSURE: 191 MMHG | BODY MASS INDEX: 23.39 KG/M2

## 2022-06-27 LAB — AMPHET UR-MCNC: NORMAL

## 2022-06-27 PROCEDURE — 99214 OFFICE O/P EST MOD 30 MIN: CPT

## 2022-06-27 PROCEDURE — 99072 ADDL SUPL MATRL&STAF TM PHE: CPT

## 2022-06-27 NOTE — HISTORY OF PRESENT ILLNESS
[FreeTextEntry1] : ORIGINAL PRESENTATION: Ms. Monroy is a 61-year-old woman who presents with a history of right-sided pain status post stroke. He also has a history of HIV neuropathy. She originally presented because her provider was away for a month and she needed to have her medications refilled. We have been following her for chronic pain ever since. She states her pain is moderate, constant, worse at night, dull/aching with numbness, pins and needles. She denies any weakness in the upper or lower extremities. She draws a corresponding pain diagram. She states that standing, sitting, walking, increases her pain. She denies any recent change in bowel or bladder habits. \par \par ACTIVITIES: She can walk two blocks before experiencing pain. She can sit for 20 minutes, stand for 30 minutes. In a day, she constantly lies down because of pain. She uses a walker to assist with ambulation. Because of her pain, she is unable to exercise. \par \par PRIOR PAIN TREATMENTS: Physical therapy, heat, and cold gave her no relief. \par \par PRIOR PAIN MEDICATIONS: Percocet, morphine (trouble breathing).\par \par TODAY: she presents for a revisit encounter. Ongoing chronic pain noted involving multiple areas. The use of medication provides satisfactory relief with beneift at 90% or higher. She remains content with her response to management and wishes to continue without change. Oxycontin 20mg PO BID remains helpful and she is without any associated side effects or issues as of late.\par \par Of note, she is pending relocating. Her apartment is infested with mice/rats and she has relayed onto her  how detrimental this can be to her health- she notes some progress with a potential move date shortly. \par UDS warranted at this time, see separate note.

## 2022-06-27 NOTE — DISCUSSION/SUMMARY
[de-identified] : Oxycontin 20mg PO q12h sent through [facility].\par \par The patient is stable on current pain medication with analgesia and without notable side effects or any obvious aberrant behaviors exhibited. Will renew medication today.\par \par Urine drug screening collected today with rapid sample result consistent with given regimen. Sample to be sent for confirmatory testing with additional relief at a later time.\par \par I personally reviewed with the PA, this patient's history and physical exam findings, as documented above. I have discussed the relevant areas of concern, having direct implications to the presenting problems and illnesses, and I have personally examined all pertinent and positive and negative findings, which impact on the prior pain management treatment. \par \par f/u 4 weeks. moderate complexity\par \par I personally reviewed with the PA, this patient's history and physical exam findings, as documented above. I have discussed the relevant areas of concern, having direct implications to the presenting problems and illnesses, and I have personally examined all pertinent and positive and negative findings, which impact on the prior pain management treatment. \par \par LUI Guy MD\par \par

## 2022-06-27 NOTE — ASSESSMENT
[FreeTextEntry1] : This is a 62 yo F who suffers from chronic pain involving the cervical and lumbar regions, which is complicated due to underlying HIV, for which the use of medication management is to continue at this time and I will revisit with her in 4 weeks for continued care/treatment effort.

## 2022-07-07 ENCOUNTER — OUTPATIENT (OUTPATIENT)
Dept: OUTPATIENT SERVICES | Facility: HOSPITAL | Age: 62
LOS: 1 days | Discharge: HOME | End: 2022-07-07

## 2022-07-07 ENCOUNTER — APPOINTMENT (OUTPATIENT)
Dept: INFECTIOUS DISEASE | Facility: CLINIC | Age: 62
End: 2022-07-07

## 2022-07-07 DIAGNOSIS — N28.9 DISORDER OF KIDNEY AND URETER, UNSPECIFIED: ICD-10-CM

## 2022-07-07 DIAGNOSIS — B20 HUMAN IMMUNODEFICIENCY VIRUS [HIV] DISEASE: ICD-10-CM

## 2022-07-07 DIAGNOSIS — I69.30 UNSPECIFIED SEQUELAE OF CEREBRAL INFARCTION: ICD-10-CM

## 2022-07-07 DIAGNOSIS — E78.5 HYPERLIPIDEMIA, UNSPECIFIED: ICD-10-CM

## 2022-07-07 DIAGNOSIS — Z98.890 OTHER SPECIFIED POSTPROCEDURAL STATES: Chronic | ICD-10-CM

## 2022-07-07 DIAGNOSIS — M54.16 RADICULOPATHY, LUMBAR REGION: ICD-10-CM

## 2022-07-07 DIAGNOSIS — I10 ESSENTIAL (PRIMARY) HYPERTENSION: ICD-10-CM

## 2022-07-07 PROCEDURE — ZZZZZ: CPT

## 2022-07-07 RX ORDER — DIPHENHYDRAMINE HCL 25 MG/1
25 TABLET ORAL
Qty: 30 | Refills: 0 | Status: DISCONTINUED | COMMUNITY
Start: 2022-04-28

## 2022-07-07 NOTE — HISTORY OF PRESENT ILLNESS
[FreeTextEntry1] : Televisit done for HIV F/U. She had no complaints.  Pain MD Note read and appreciated; she was prescribed Oxycontin ER 20 mg q 12h with good response. BPs taken at home were reviewed and have been normal. She reported full compliance with all meds without any SE.  [Sexually Active] : The patient is not sexually active

## 2022-07-07 NOTE — REASON FOR VISIT
[Home] : at home, [unfilled] , at the time of the visit. [Medical Office: ([facility])___] : at the medical office located in  [Verbal consent obtained from patient] : the patient, [unfilled] [Follow-Up: _____] : a [unfilled] follow-up visit

## 2022-07-07 NOTE — ASSESSMENT
[FreeTextEntry1] : Symptomatic HIV, AIDS with Mild Viremia probably due to taking at the same time as Vitamins (H/O Resistance to Epivir, Emtriva, and Rilpivirine so she will not be a candidate for Cabenuava when fully suppressed)\par                                           Continue present meds; all meds were reviewed with her today.\par                                           RTC on a Thursday am in September for Routine labs\par                                           F/U with Pain MD for chronic pain\par \par Hyperlipidemia, not well controlled (pt reported ful compliance)\par                                            Will increase statin to 40 mg q hs\par                                            Repeat lipids (fasting) in 2-3 months\par \par She was given all lab results today with full understanding. [Treatment Education] : treatment education [Treatment Adherence] : treatment adherence [Rx Dose / Side Effects] : Rx dose/side effects [Nutritional / Food Issues] : nutritional/food issues [Medical Care Issues] : medical care issues [Drug Interactions / Side Effects] : drug interactions/side effects [Smoking Cessation] : smoking cessation

## 2022-07-22 ENCOUNTER — APPOINTMENT (OUTPATIENT)
Dept: PAIN MANAGEMENT | Facility: CLINIC | Age: 62
End: 2022-07-22

## 2022-07-22 VITALS
WEIGHT: 132 LBS | SYSTOLIC BLOOD PRESSURE: 163 MMHG | BODY MASS INDEX: 20.72 KG/M2 | HEART RATE: 74 BPM | HEIGHT: 67 IN | DIASTOLIC BLOOD PRESSURE: 78 MMHG

## 2022-07-22 PROCEDURE — 99072 ADDL SUPL MATRL&STAF TM PHE: CPT

## 2022-07-22 PROCEDURE — 99213 OFFICE O/P EST LOW 20 MIN: CPT

## 2022-07-22 NOTE — REVIEW OF SYSTEMS
[Arthralgia] : arthralgia [Chills] : no chills [Discharge] : no discharge [Decrease Hearing] : no decrease in hearing [Lower Ext Edema] : no lower extremity edema [SOB at rest] : no shortness of breath at rest

## 2022-07-22 NOTE — PHYSICAL EXAM
[Normal Coordination] : normal coordination [Normal Sensation] : normal sensation [Orientated] : orientated [Normal Skin] : normal skin [No obvious lymphadenopathy in areas examined] : no obvious lymphadenopathy in areas examined [Well Developed] : well developed [Peripheral vascular exam is grossly normal] : peripheral vascular exam is grossly normal [No Respiratory Distress] : no respiratory distress [de-identified] : \par Neck: Palpation of the cervical spine is as follows: bilateral paracervical tenderness. Range of motion of the cervical spine is as follows: diminished range of motion in all planes \par \par Back, including spine: Palpation of the thoracic/lumbar spine is as follows: bilateral lumbar paraspinal tenderness and bilateral SI joint tenderness. Gait and function is as follows: antalgic gait and uses walker.

## 2022-07-22 NOTE — HISTORY OF PRESENT ILLNESS
[FreeTextEntry1] : ORIGINAL PRESENTATION: Ms. Monroy is a 61-year-old woman who presents with a history of right-sided pain status post stroke. He also has a history of HIV neuropathy. She originally presented because her provider was away for a month and she needed to have her medications refilled. We have been following her for chronic pain ever since. She states her pain is moderate, constant, worse at night, dull/aching with numbness, pins and needles. She denies any weakness in the upper or lower extremities. She draws a corresponding pain diagram. She states that standing, sitting, walking, increases her pain. She denies any recent change in bowel or bladder habits. \par \par ACTIVITIES: She can walk two blocks before experiencing pain. She can sit for 20 minutes, stand for 30 minutes. In a day, she constantly lies down because of pain. She uses a walker to assist with ambulation. Because of her pain, she is unable to exercise. \par \par PRIOR PAIN TREATMENTS: Physical therapy, heat, and cold gave her no relief. \par \par PRIOR PAIN MEDICATIONS: Percocet, morphine (trouble breathing).\par \par TODAY: she notes stable pain control with regards to Oxycontin use. The preparation reduces the intensity of her lumbosacral pain features by 50% or higher and she remains largely content with her reduced pain at this time. She maintains the ability to ambulate with the assistance of a walker and she denies any adverse side effects or issues with the given regimen. She wishes to continue without change at this time and I will revisit with her in 4 weeks for continued care/treatment efforts.\par \par Of note, she has yet to relocate. As mentioned during her last encounter, her apartment is infested with rats/mice and she is pending city assistance with regards to relocating

## 2022-07-22 NOTE — DISCUSSION/SUMMARY
[de-identified] : Oxycontin 20mg PO q12h renewed\par \par The patient is stable on current pain medication with analgesia and without notable side effects or any obvious aberrant behaviors exhibited. Will renew medication today.\par \par I personally reviewed with the PA, this patient's history and physical exam findings, as documented above. I have discussed the relevant areas of concern, having direct implications to the presenting problems and illnesses, and I have personally examined all pertinent and positive and negative findings, which impact on the prior pain management treatment. \par \par f/u 4 weeks. 25 min encounter\par \par I personally reviewed with the PA, this patient's history and physical exam findings, as documented above. I have discussed the relevant areas of concern, having direct implications to the presenting problems and illnesses, and I have personally examined all pertinent and positive and negative findings, which impact on the prior pain management treatment. \par \par LUI Guy MD\par \par

## 2022-07-27 ENCOUNTER — EMERGENCY (EMERGENCY)
Facility: HOSPITAL | Age: 62
LOS: 0 days | Discharge: LEFT BEFORE DISCHARGE | End: 2022-07-27
Attending: EMERGENCY MEDICINE | Admitting: EMERGENCY MEDICINE

## 2022-07-27 VITALS
RESPIRATION RATE: 18 BRPM | WEIGHT: 143.08 LBS | OXYGEN SATURATION: 99 % | TEMPERATURE: 98 F | HEIGHT: 63 IN | HEART RATE: 77 BPM | DIASTOLIC BLOOD PRESSURE: 99 MMHG | SYSTOLIC BLOOD PRESSURE: 234 MMHG

## 2022-07-27 VITALS — DIASTOLIC BLOOD PRESSURE: 86 MMHG | SYSTOLIC BLOOD PRESSURE: 191 MMHG

## 2022-07-27 DIAGNOSIS — J44.9 CHRONIC OBSTRUCTIVE PULMONARY DISEASE, UNSPECIFIED: ICD-10-CM

## 2022-07-27 DIAGNOSIS — Z88.2 ALLERGY STATUS TO SULFONAMIDES: ICD-10-CM

## 2022-07-27 DIAGNOSIS — Z80.3 FAMILY HISTORY OF MALIGNANT NEOPLASM OF BREAST: ICD-10-CM

## 2022-07-27 DIAGNOSIS — Z53.29 PROCEDURE AND TREATMENT NOT CARRIED OUT BECAUSE OF PATIENT'S DECISION FOR OTHER REASONS: ICD-10-CM

## 2022-07-27 DIAGNOSIS — R42 DIZZINESS AND GIDDINESS: ICD-10-CM

## 2022-07-27 DIAGNOSIS — R07.89 OTHER CHEST PAIN: ICD-10-CM

## 2022-07-27 DIAGNOSIS — R06.02 SHORTNESS OF BREATH: ICD-10-CM

## 2022-07-27 DIAGNOSIS — I10 ESSENTIAL (PRIMARY) HYPERTENSION: ICD-10-CM

## 2022-07-27 DIAGNOSIS — Z80.1 FAMILY HISTORY OF MALIGNANT NEOPLASM OF TRACHEA, BRONCHUS AND LUNG: ICD-10-CM

## 2022-07-27 DIAGNOSIS — E78.5 HYPERLIPIDEMIA, UNSPECIFIED: ICD-10-CM

## 2022-07-27 DIAGNOSIS — B20 HUMAN IMMUNODEFICIENCY VIRUS [HIV] DISEASE: ICD-10-CM

## 2022-07-27 DIAGNOSIS — Z98.890 OTHER SPECIFIED POSTPROCEDURAL STATES: Chronic | ICD-10-CM

## 2022-07-27 DIAGNOSIS — R85.612 LOW GRADE SQUAMOUS INTRAEPITHELIAL LESION ON CYTOLOGIC SMEAR OF ANUS (LGSIL): ICD-10-CM

## 2022-07-27 DIAGNOSIS — I69.351 HEMIPLEGIA AND HEMIPARESIS FOLLOWING CEREBRAL INFARCTION AFFECTING RIGHT DOMINANT SIDE: ICD-10-CM

## 2022-07-27 LAB
ALBUMIN SERPL ELPH-MCNC: 4.7 G/DL — SIGNIFICANT CHANGE UP (ref 3.5–5.2)
ALP SERPL-CCNC: 147 U/L — HIGH (ref 30–115)
ALT FLD-CCNC: 7 U/L — SIGNIFICANT CHANGE UP (ref 0–41)
ANION GAP SERPL CALC-SCNC: 11 MMOL/L — SIGNIFICANT CHANGE UP (ref 7–14)
AST SERPL-CCNC: 16 U/L — SIGNIFICANT CHANGE UP (ref 0–41)
BASOPHILS # BLD AUTO: 0.03 K/UL — SIGNIFICANT CHANGE UP (ref 0–0.2)
BASOPHILS NFR BLD AUTO: 0.6 % — SIGNIFICANT CHANGE UP (ref 0–1)
BILIRUB SERPL-MCNC: 0.2 MG/DL — SIGNIFICANT CHANGE UP (ref 0.2–1.2)
BUN SERPL-MCNC: 15 MG/DL — SIGNIFICANT CHANGE UP (ref 10–20)
CALCIUM SERPL-MCNC: 9.9 MG/DL — SIGNIFICANT CHANGE UP (ref 8.5–10.1)
CHLORIDE SERPL-SCNC: 102 MMOL/L — SIGNIFICANT CHANGE UP (ref 98–110)
CO2 SERPL-SCNC: 27 MMOL/L — SIGNIFICANT CHANGE UP (ref 17–32)
CREAT SERPL-MCNC: 1 MG/DL — SIGNIFICANT CHANGE UP (ref 0.7–1.5)
EGFR: 65 ML/MIN/1.73M2 — SIGNIFICANT CHANGE UP
EOSINOPHIL # BLD AUTO: 0.09 K/UL — SIGNIFICANT CHANGE UP (ref 0–0.7)
EOSINOPHIL NFR BLD AUTO: 1.9 % — SIGNIFICANT CHANGE UP (ref 0–8)
GLUCOSE SERPL-MCNC: 89 MG/DL — SIGNIFICANT CHANGE UP (ref 70–99)
HCG SERPL QL: NEGATIVE — SIGNIFICANT CHANGE UP
HCT VFR BLD CALC: 39.8 % — SIGNIFICANT CHANGE UP (ref 37–47)
HGB BLD-MCNC: 13.3 G/DL — SIGNIFICANT CHANGE UP (ref 12–16)
IMM GRANULOCYTES NFR BLD AUTO: 0.2 % — SIGNIFICANT CHANGE UP (ref 0.1–0.3)
LIDOCAIN IGE QN: 50 U/L — SIGNIFICANT CHANGE UP (ref 7–60)
LYMPHOCYTES # BLD AUTO: 2.14 K/UL — SIGNIFICANT CHANGE UP (ref 1.2–3.4)
LYMPHOCYTES # BLD AUTO: 44.1 % — SIGNIFICANT CHANGE UP (ref 20.5–51.1)
MAGNESIUM SERPL-MCNC: 1.8 MG/DL — SIGNIFICANT CHANGE UP (ref 1.8–2.4)
MCHC RBC-ENTMCNC: 30.4 PG — SIGNIFICANT CHANGE UP (ref 27–31)
MCHC RBC-ENTMCNC: 33.4 G/DL — SIGNIFICANT CHANGE UP (ref 32–37)
MCV RBC AUTO: 91.1 FL — SIGNIFICANT CHANGE UP (ref 81–99)
MONOCYTES # BLD AUTO: 0.51 K/UL — SIGNIFICANT CHANGE UP (ref 0.1–0.6)
MONOCYTES NFR BLD AUTO: 10.5 % — HIGH (ref 1.7–9.3)
NEUTROPHILS # BLD AUTO: 2.07 K/UL — SIGNIFICANT CHANGE UP (ref 1.4–6.5)
NEUTROPHILS NFR BLD AUTO: 42.7 % — SIGNIFICANT CHANGE UP (ref 42.2–75.2)
NRBC # BLD: 0 /100 WBCS — SIGNIFICANT CHANGE UP (ref 0–0)
PLATELET # BLD AUTO: 217 K/UL — SIGNIFICANT CHANGE UP (ref 130–400)
POTASSIUM SERPL-MCNC: 4.6 MMOL/L — SIGNIFICANT CHANGE UP (ref 3.5–5)
POTASSIUM SERPL-SCNC: 4.6 MMOL/L — SIGNIFICANT CHANGE UP (ref 3.5–5)
PROT SERPL-MCNC: 7.4 G/DL — SIGNIFICANT CHANGE UP (ref 6–8)
RBC # BLD: 4.37 M/UL — SIGNIFICANT CHANGE UP (ref 4.2–5.4)
RBC # FLD: 14.7 % — HIGH (ref 11.5–14.5)
SODIUM SERPL-SCNC: 140 MMOL/L — SIGNIFICANT CHANGE UP (ref 135–146)
TROPONIN T SERPL-MCNC: <0.01 NG/ML — SIGNIFICANT CHANGE UP
WBC # BLD: 4.85 K/UL — SIGNIFICANT CHANGE UP (ref 4.8–10.8)
WBC # FLD AUTO: 4.85 K/UL — SIGNIFICANT CHANGE UP (ref 4.8–10.8)

## 2022-07-27 PROCEDURE — 36000 PLACE NEEDLE IN VEIN: CPT

## 2022-07-27 PROCEDURE — 93010 ELECTROCARDIOGRAM REPORT: CPT

## 2022-07-27 PROCEDURE — 76937 US GUIDE VASCULAR ACCESS: CPT | Mod: 26

## 2022-07-27 PROCEDURE — 99285 EMERGENCY DEPT VISIT HI MDM: CPT | Mod: 25

## 2022-07-27 PROCEDURE — 71045 X-RAY EXAM CHEST 1 VIEW: CPT | Mod: 26

## 2022-07-27 RX ORDER — ACETAMINOPHEN 500 MG
2 TABLET ORAL
Qty: 0 | Refills: 0 | DISCHARGE

## 2022-07-27 RX ORDER — NICOTINE POLACRILEX 2 MG
1 GUM BUCCAL
Qty: 0 | Refills: 0 | DISCHARGE

## 2022-07-27 RX ORDER — ALBUTEROL 90 UG/1
2 AEROSOL, METERED ORAL
Qty: 0 | Refills: 0 | DISCHARGE

## 2022-07-27 RX ORDER — DAPSONE 100 MG/1
0 TABLET ORAL
Qty: 0 | Refills: 0 | DISCHARGE

## 2022-07-27 RX ORDER — GABAPENTIN 400 MG/1
0 CAPSULE ORAL
Qty: 0 | Refills: 0 | DISCHARGE

## 2022-07-27 RX ORDER — ATORVASTATIN CALCIUM 80 MG/1
0 TABLET, FILM COATED ORAL
Qty: 0 | Refills: 0 | DISCHARGE

## 2022-07-27 RX ORDER — DOLUTEGRAVIR SODIUM 25 MG/1
0 TABLET, FILM COATED ORAL
Qty: 0 | Refills: 0 | DISCHARGE

## 2022-07-27 RX ORDER — DIPHENHYDRAMINE HCL 50 MG
1 CAPSULE ORAL
Qty: 0 | Refills: 0 | DISCHARGE

## 2022-07-27 RX ORDER — ERGOCALCIFEROL 1.25 MG/1
1 CAPSULE ORAL
Qty: 0 | Refills: 0 | DISCHARGE

## 2022-07-27 RX ORDER — ATOVAQUONE 750 MG/5ML
5 SUSPENSION ORAL
Qty: 0 | Refills: 0 | DISCHARGE

## 2022-07-27 RX ORDER — METOPROLOL TARTRATE 50 MG
1 TABLET ORAL
Qty: 0 | Refills: 0 | DISCHARGE

## 2022-07-27 RX ORDER — ASPIRIN/CALCIUM CARB/MAGNESIUM 324 MG
325 TABLET ORAL ONCE
Refills: 0 | Status: COMPLETED | OUTPATIENT
Start: 2022-07-27 | End: 2022-07-27

## 2022-07-27 RX ORDER — DARUNAVIR ETHANOLATE AND COBICISTAT 800; 150 MG/1; MG/1
1 TABLET, FILM COATED ORAL
Qty: 0 | Refills: 0 | DISCHARGE

## 2022-07-27 RX ORDER — IPRATROPIUM/ALBUTEROL SULFATE 18-103MCG
0 AEROSOL WITH ADAPTER (GRAM) INHALATION
Qty: 0 | Refills: 0 | DISCHARGE

## 2022-07-27 RX ADMIN — Medication 325 MILLIGRAM(S): at 14:49

## 2022-07-27 RX ADMIN — Medication 2.5 MILLIGRAM(S): at 16:08

## 2022-07-27 NOTE — ED PROVIDER NOTE - PATIENT PORTAL LINK FT
You can access the FollowMyHealth Patient Portal offered by [facility] by registering at the following website: [email or web address]. By joining [facility]’s FollowMyHealth portal, you will also be able to view your health information using other applications (apps) compatible with our system.

## 2022-07-27 NOTE — ED PROVIDER NOTE - OBJECTIVE STATEMENT
61 year old female, past medical history htn, hld, hiv +viral load detected, asthma, who presents with chest pain. patient endorses central, non-radiating chest pain that began while putting up decorations. patient reports associated lightheadedness/shortness of breath, which resolved upon rest. patient with intermittent episodes since, noted to be hypertensive prompting ed eval. denies fever, chills, hemoptysis, back pain, abd pain, nausea/vomiting, syncope, leg pain/swelling, numbness/weakness.

## 2022-07-27 NOTE — ED PROVIDER NOTE - IV ALTEPLASE DOOR HIDDEN
Writer phoned leonid and notified her of need to schedule. Also writer spoke to daughter Genie Zamorano and notified her of plan.  Genie Lazorie stated understanding show

## 2022-07-27 NOTE — ED PROVIDER NOTE - PROVIDER TOKENS
PROVIDER:[TOKEN:[[id number]],FOLLOWUP:[1-3 Days]],PROVIDER:[TOKEN:[[id number]],FOLLOWUP:[1-3 Days]]

## 2022-07-27 NOTE — ED PROVIDER NOTE - CARE PROVIDER_API CALL
Clayton Oliva  CARDIOVASCULAR DISEASE  [address]  [place] [address]  Phone: [phone]  Fax: [phone]  Follow Up Time: 1-3 Days    Kvng Thompson  CARDIOVASCULAR DISEASE  [address] [facility] [address]  [place] [address]  Phone: [phone]  Fax: [phone]  Follow Up Time: 1-3 Days

## 2022-07-27 NOTE — ED PROVIDER NOTE - NSFOLLOWUPINSTRUCTIONS_ED_ALL_ED_FT

## 2022-07-27 NOTE — ED ADULT NURSE NOTE - NSICDXPASTMEDICALHX_GEN_ALL_CORE_FT
PAST MEDICAL HISTORY:  COPD (chronic obstructive pulmonary disease)     CVA (cerebral vascular accident) 1997 RIGHT SIDED WEAKNESS    HIV (human immunodeficiency virus infection) DX 1997i eighr    HTN (hypertension)     Low grade squamous intraepithelial lesion on cytologic smear of anus (LGSIL)

## 2022-07-27 NOTE — ED ADULT NURSE NOTE - OBJECTIVE STATEMENT
60 y/o female c/o of chest pain and lightheadedness starting this morning while putting up decorations.

## 2022-07-27 NOTE — ED PROVIDER NOTE - CARE PLAN
Principal Discharge DX:	Chest pain   1 Principal Discharge DX:	Chest pain  Secondary Diagnosis:	Left against medical advice

## 2022-07-27 NOTE — ED PROVIDER NOTE - CLINICAL SUMMARY MEDICAL DECISION MAKING FREE TEXT BOX
Pt with chest pain and elevated bp, w/u in ED reassuring, bp treated, pt advised to continue cardiac w/u in cardiac obs, but refused to stay.  The patient wishes to leave against medical advice.  I have discussed the risks, benefits and alternatives (including the possibility of worsening of disease, pain, permanent disability, and/or death) with the patient and his/her family (if available).  The patient voices understanding of these risks, benefits, and alternatives and still wishes to sign out against medical advice.  The patient is awake, alert, oriented  x 3 and has demonstrated capacity to refuse/direct care.  I have advised the patient that they can and should return immediately should they develop any worse/different/additional symptoms, or if they change their mind and want to continue their care.

## 2022-07-27 NOTE — ED PROVIDER NOTE - PHYSICAL EXAMINATION
CONSTITUTIONAL: Well-developed; well-nourished; in no acute distress, nontoxic appearing  SKIN: skin exam is warm and dry  HEAD: Normocephalic; atraumatic  EYES: PERRL, conjunctiva and sclera clear.  ENT: MMM   CARD: S1, S2 normal, no murmur  RESP: No wheezes, rales or rhonchi. Good air movement bilaterally  ABD: soft; non-distended; non-tender.    EXT: Normal ROM. pulses 2+   NEURO: awake, alert, following commands, oriented, grossly unremarkable. No Focal deficits. GCS 15.   PSYCH: Cooperative, appropriate.

## 2022-07-27 NOTE — ED PROVIDER NOTE - ATTENDING APP SHARED VISIT CONTRIBUTION OF CARE
60 yo f with pmh of htn, hld, hiv, asthma, presents with chest pain.  pt says started yesterday while decorating.  pt admits has had similar chest pain in the past, but never had cardiac w/u.  pt says pain is resolved now.  no fever, no chills, no cough.  pt also says her bp has been high and she is concerned.  not headache, no focal neuro sx.  no n/v/d, no abd pain.  exam: nad, ncat, perrl, eomi, mmm, rrr, ctab, abd soft, nt, nd aox3, imp: pt with chest pain and elevated bp, will r/o acs, ekg, cxr, will treat bp, offered cardiac obs

## 2022-07-27 NOTE — ED PROVIDER NOTE - NS ED ROS FT
Review of Systems:  	•	CONSTITUTIONAL: no fever  	•	SKIN: no rash   	•	RESPIRATORY: no shortness of breath, no cough  	•	CARDIAC: +chest pain, no palpitations  	•	GI: no abd pain, no nausea, no vomiting, no diarrhea   	•	MUSCULOSKELETAL: no joint paint, no swelling, no redness  	•	NEUROLOGIC: +lightheadedness. no headache, no paresthesias, no syncope   	•	PSYCH: no anxiety, non suicidal, non homicidal, no hallucination, no depression

## 2022-07-27 NOTE — ED ADULT NURSE NOTE - NSIMPLEMENTINTERV_GEN_ALL_ED
Implemented All Universal Safety Interventions:  [place] to call system. Call bell, personal items and telephone within reach. Instruct patient to call for assistance. Room bathroom lighting operational. Non-slip footwear when patient is off stretcher. Physically safe environment: no spills, clutter or unnecessary equipment. Stretcher in lowest position, wheels locked, appropriate side rails in place.

## 2022-08-18 ENCOUNTER — APPOINTMENT (OUTPATIENT)
Dept: INFECTIOUS DISEASE | Facility: CLINIC | Age: 62
End: 2022-08-18

## 2022-08-24 ENCOUNTER — APPOINTMENT (OUTPATIENT)
Dept: PAIN MANAGEMENT | Facility: CLINIC | Age: 62
End: 2022-08-24

## 2022-08-24 VITALS
HEIGHT: 67 IN | HEART RATE: 81 BPM | SYSTOLIC BLOOD PRESSURE: 150 MMHG | BODY MASS INDEX: 20.72 KG/M2 | WEIGHT: 132 LBS | DIASTOLIC BLOOD PRESSURE: 74 MMHG

## 2022-08-24 PROCEDURE — 99213 OFFICE O/P EST LOW 20 MIN: CPT

## 2022-08-24 PROCEDURE — 99072 ADDL SUPL MATRL&STAF TM PHE: CPT

## 2022-08-24 NOTE — PHYSICAL EXAM
[Normal Coordination] : normal coordination [Normal Sensation] : normal sensation [Orientated] : orientated [Normal Skin] : normal skin [No obvious lymphadenopathy in areas examined] : no obvious lymphadenopathy in areas examined [Well Developed] : well developed [Peripheral vascular exam is grossly normal] : peripheral vascular exam is grossly normal [No Respiratory Distress] : no respiratory distress [de-identified] : \par Neck: Palpation of the cervical spine is as follows: bilateral paracervical tenderness. Range of motion of the cervical spine is as follows: diminished range of motion in all planes \par \par Back, including spine: Palpation of the thoracic/lumbar spine is as follows: bilateral lumbar paraspinal tenderness and bilateral SI joint tenderness. Gait and function is as follows: antalgic gait and uses walker.

## 2022-08-24 NOTE — HISTORY OF PRESENT ILLNESS
[FreeTextEntry1] : ORIGINAL PRESENTATION: Ms. Monroy is a 61-year-old woman who presents with a history of right-sided pain status post stroke. He also has a history of HIV neuropathy. She originally presented because her provider was away for a month and she needed to have her medications refilled. We have been following her for chronic pain ever since. She states her pain is moderate, constant, worse at night, dull/aching with numbness, pins and needles. She denies any weakness in the upper or lower extremities. She draws a corresponding pain diagram. She states that standing, sitting, walking, increases her pain. She denies any recent change in bowel or bladder habits. \par \par ACTIVITIES: She can walk two blocks before experiencing pain. She can sit for 20 minutes, stand for 30 minutes. In a day, she constantly lies down because of pain. She uses a walker to assist with ambulation. Because of her pain, she is unable to exercise. \par \par PRIOR PAIN TREATMENTS: Physical therapy, heat, and cold gave her no relief. \par \par PRIOR PAIN MEDICATIONS: Percocet, morphine (trouble breathing).\par \par TODAY: I had the pleasure of seeing Ms. Monroy today in follow up.  Her previous history and physical findings have been reviewed.\par \par She is under our are for R sided pain s/p stroke as well as HIV neuropathy which are chronic conditions she is receiving continuing active treatment for.  She states nothing has changed over the past few weeks with respect to her condition.  She remains stable on a regimen of Oxycontin 20 mg bid which provides her with 50% relief allowing her to function and perform ADLs without side effects.  She is pleased with the results she is experiencing and we will continue as is without change.  \par \par

## 2022-08-24 NOTE — ASSESSMENT
[FreeTextEntry1] : This is a 60 yo F who suffers from chronic pain involving the cervical and lumbar regions as well as HIV neuropathy and pain s/p CVA. We will continue to prescribe oxycontin as mentioned and f/u in 4-8 weeks for re evaluation. \par \par I personally reviewed with the PA, this patient's history and physical exam findings, as documented above. I have discussed the relevant areas of concern, having direct implications to the presenting problems and illnesses, and I have personally examined all pertinent and positive and negative findings, which impact on the prior pain management treatment. \par \par \par Check of the  registry reveals compliance in regards to narcotic medication management use\par \par \par Madisyn Rodriguez, MS, PA-C\par Beatriz Herzog MD\par

## 2022-09-07 ENCOUNTER — NON-APPOINTMENT (OUTPATIENT)
Age: 62
End: 2022-09-07

## 2022-09-26 ENCOUNTER — APPOINTMENT (OUTPATIENT)
Dept: INFECTIOUS DISEASE | Facility: CLINIC | Age: 62
End: 2022-09-26

## 2022-09-26 ENCOUNTER — NON-APPOINTMENT (OUTPATIENT)
Age: 62
End: 2022-09-26

## 2022-09-26 ENCOUNTER — OUTPATIENT (OUTPATIENT)
Dept: OUTPATIENT SERVICES | Facility: HOSPITAL | Age: 62
LOS: 1 days | Discharge: HOME | End: 2022-09-26

## 2022-09-26 VITALS
BODY MASS INDEX: 21.97 KG/M2 | DIASTOLIC BLOOD PRESSURE: 74 MMHG | TEMPERATURE: 98.7 F | OXYGEN SATURATION: 14 % | HEART RATE: 85 BPM | SYSTOLIC BLOOD PRESSURE: 127 MMHG | WEIGHT: 140 LBS | HEIGHT: 67 IN | RESPIRATION RATE: 14 BRPM

## 2022-09-26 DIAGNOSIS — Z98.890 OTHER SPECIFIED POSTPROCEDURAL STATES: Chronic | ICD-10-CM

## 2022-09-26 DIAGNOSIS — B20 HUMAN IMMUNODEFICIENCY VIRUS [HIV] DISEASE: ICD-10-CM

## 2022-09-26 PROCEDURE — 99213 OFFICE O/P EST LOW 20 MIN: CPT | Mod: GC

## 2022-09-26 RX ORDER — IPRATROPIUM BROMIDE AND ALBUTEROL 20; 100 UG/1; UG/1
20-100 SPRAY, METERED RESPIRATORY (INHALATION) 4 TIMES DAILY
Qty: 1 | Refills: 5 | Status: DISCONTINUED | COMMUNITY
Start: 2021-09-23 | End: 2022-09-26

## 2022-09-28 ENCOUNTER — NON-APPOINTMENT (OUTPATIENT)
Age: 62
End: 2022-09-28

## 2022-09-28 ENCOUNTER — OUTPATIENT (OUTPATIENT)
Dept: OUTPATIENT SERVICES | Facility: HOSPITAL | Age: 62
LOS: 1 days | Discharge: HOME | End: 2022-09-28

## 2022-09-28 DIAGNOSIS — Z98.890 OTHER SPECIFIED POSTPROCEDURAL STATES: Chronic | ICD-10-CM

## 2022-10-12 DIAGNOSIS — B20 HUMAN IMMUNODEFICIENCY VIRUS [HIV] DISEASE: ICD-10-CM

## 2022-10-17 ENCOUNTER — OUTPATIENT (OUTPATIENT)
Dept: OUTPATIENT SERVICES | Facility: HOSPITAL | Age: 62
LOS: 1 days | Discharge: HOME | End: 2022-10-17

## 2022-10-17 ENCOUNTER — NON-APPOINTMENT (OUTPATIENT)
Age: 62
End: 2022-10-17

## 2022-10-17 DIAGNOSIS — Z98.890 OTHER SPECIFIED POSTPROCEDURAL STATES: Chronic | ICD-10-CM

## 2022-10-19 ENCOUNTER — LABORATORY RESULT (OUTPATIENT)
Age: 62
End: 2022-10-19

## 2022-10-20 ENCOUNTER — APPOINTMENT (OUTPATIENT)
Dept: PAIN MANAGEMENT | Facility: CLINIC | Age: 62
End: 2022-10-20

## 2022-10-20 VITALS — HEART RATE: 75 BPM | DIASTOLIC BLOOD PRESSURE: 73 MMHG | SYSTOLIC BLOOD PRESSURE: 134 MMHG

## 2022-10-20 PROCEDURE — 99072 ADDL SUPL MATRL&STAF TM PHE: CPT

## 2022-10-20 PROCEDURE — 99213 OFFICE O/P EST LOW 20 MIN: CPT

## 2022-10-20 NOTE — PHYSICAL EXAM
[Normal Coordination] : normal coordination [Normal Sensation] : normal sensation [Orientated] : orientated [Normal Skin] : normal skin [No obvious lymphadenopathy in areas examined] : no obvious lymphadenopathy in areas examined [Well Developed] : well developed [Peripheral vascular exam is grossly normal] : peripheral vascular exam is grossly normal [No Respiratory Distress] : no respiratory distress [de-identified] : \par Neck: Palpation of the cervical spine is as follows: bilateral paracervical tenderness. Range of motion of the cervical spine is as follows: diminished range of motion in all planes \par \par Back, including spine: Palpation of the thoracic/lumbar spine is as follows: bilateral lumbar paraspinal tenderness and bilateral SI joint tenderness. Gait and function is as follows: antalgic gait and uses walker.

## 2022-10-20 NOTE — HISTORY OF PRESENT ILLNESS
[FreeTextEntry1] : ORIGINAL PRESENTATION: Ms. Monroy is a 62-year-old woman who presents with a history of right-sided pain status post stroke. He also has a history of HIV neuropathy. She originally presented because her provider was away for a month and she needed to have her medications refilled. We have been following her for chronic pain ever since. She states her pain is moderate, constant, worse at night, dull/aching with numbness, pins and needles. She denies any weakness in the upper or lower extremities. She draws a corresponding pain diagram. She states that standing, sitting, walking, increases her pain. She denies any recent change in bowel or bladder habits. \par \par ACTIVITIES: She can walk two blocks before experiencing pain. She can sit for 20 minutes, stand for 30 minutes. In a day, she constantly lies down because of pain. She uses a walker to assist with ambulation. Because of her pain, she is unable to exercise. \par \par PRIOR PAIN TREATMENTS: Physical therapy, heat, and cold gave her no relief. \par \par PRIOR PAIN MEDICATIONS: Percocet, morphine (trouble breathing).\par \par TODAY: She presents for a revisit encounter. Ongoing chronic pain noted involving multiple areas. The use of Oxycontin 20 mg BID along with gabapentin provides satisfactory relief with benefit at 90% or higher. The regimen remains helpful and she is without any associated side effects or issues as of late. She remains content with her response to management and wishes to continue without change. \par \par UDS, repeated today, 10/20/22 - see separate note.\par \par

## 2022-10-20 NOTE — DISCUSSION/SUMMARY
[de-identified] : I have consulted the  registry for the purpose of reviewing the patient's controlled substance.\par \par Overall there is at least a 30-50% reduction in pain with the prescribed analgesics. The patient denies any adverse side effects due to the medication (sleeping disturbance, constipation, sleepiness, hallucinations and/or urination problems). \par \par Urine drug screening collected today with rapid sample result consistent with given regimen. Sample to be sent for confirmatory testing with additional relief at a later time.\par \par The patient will return to the office in 4 weeks time and is aware to contact me with any question or concerns in the interim.\par \par Kathrine Hu PA-C \par Beatriz Herzog MD\par \par

## 2022-10-20 NOTE — ASSESSMENT
[FreeTextEntry1] : This is a 63 yo F who suffers from chronic pain. Stable on current regimen. Will renew Oxycontin today. RTO in 4 weeks.\par

## 2022-10-26 ENCOUNTER — APPOINTMENT (OUTPATIENT)
Dept: INFECTIOUS DISEASE | Facility: CLINIC | Age: 62
End: 2022-10-26

## 2022-10-26 ENCOUNTER — OUTPATIENT (OUTPATIENT)
Dept: OUTPATIENT SERVICES | Facility: HOSPITAL | Age: 62
LOS: 1 days | Discharge: HOME | End: 2022-10-26

## 2022-10-26 DIAGNOSIS — B20 HUMAN IMMUNODEFICIENCY VIRUS [HIV] DISEASE: ICD-10-CM

## 2022-10-26 DIAGNOSIS — Z98.890 OTHER SPECIFIED POSTPROCEDURAL STATES: Chronic | ICD-10-CM

## 2022-10-26 PROCEDURE — ZZZZZ: CPT

## 2022-11-16 ENCOUNTER — APPOINTMENT (OUTPATIENT)
Dept: PAIN MANAGEMENT | Facility: CLINIC | Age: 62
End: 2022-11-16

## 2022-11-16 VITALS
HEART RATE: 82 BPM | HEIGHT: 67 IN | BODY MASS INDEX: 21.97 KG/M2 | WEIGHT: 140 LBS | DIASTOLIC BLOOD PRESSURE: 76 MMHG | SYSTOLIC BLOOD PRESSURE: 134 MMHG

## 2022-11-16 DIAGNOSIS — B20 HUMAN IMMUNODEFICIENCY VIRUS [HIV] DISEASE: ICD-10-CM

## 2022-11-16 PROCEDURE — 99072 ADDL SUPL MATRL&STAF TM PHE: CPT

## 2022-11-16 PROCEDURE — 99214 OFFICE O/P EST MOD 30 MIN: CPT

## 2022-11-16 NOTE — DATA REVIEWED
[FreeTextEntry1] : SOAPP: low on 11/16/22 \par Low risk: Patient has combination of a low risk SOAP and no risk factors. UDS would be repeated randomly every quarter.\par \par UDS:10/20/22 negative\par \par [place] REGISTRY: Checked\par \par \par \par

## 2022-11-16 NOTE — PHYSICAL EXAM
[Normal Coordination] : normal coordination [Normal DTR UE/LE] : normal DTR UE/LE  [Normal Sensation] : normal sensation [Normal Mood and Affect] : normal mood and affect [Orientated] : orientated [Able to Communicate] : able to communicate [Normal Skin] : normal skin [No obvious lymphadenopathy in areas examined] : no obvious lymphadenopathy in areas examined [Well Developed] : well developed [Well Nourished] : well nourished [Peripheral vascular exam is grossly normal] : peripheral vascular exam is grossly normal [No Respiratory Distress] : no respiratory distress [Diminished] : brachioradialis reflex diminished [] : uses walker [FreeTextEntry8] : b

## 2022-11-16 NOTE — HISTORY OF PRESENT ILLNESS
[FreeTextEntry1] : ORIGINAL PRESENTATION: Ms. Monroy is a 62-year-old woman who presents with a history of right-sided pain status post stroke. He also has a history of HIV neuropathy. She originally presented because her provider was away for a month and she needed to have her medications refilled. We have been following her for chronic pain ever since. She states her pain is moderate, constant, worse at night, dull/aching with numbness, pins and needles. She denies any weakness in the upper or lower extremities. She draws a corresponding pain diagram. She states that standing, sitting, walking, increases her pain. She denies any recent change in bowel or bladder habits. \par \par ACTIVITIES: She can walk two blocks before experiencing pain. She can sit for 20 minutes, stand for 30 minutes. In a day, she constantly lies down because of pain. She uses a walker to assist with ambulation. Because of her pain, she is unable to exercise. \par \par PRIOR PAIN TREATMENTS: Physical therapy, heat, and cold gave her no relief. \par \par PRIOR PAIN MEDICATIONS: Percocet, morphine (trouble breathing).\par \par PATIENT PRESENTS FOR FOLLOW UP: She presents for a revisit encounter. Ongoing chronic pain noted involving multiple areas. The use of OxyContin 20 mg BID along with gabapentin provides satisfactory relief with benefit at 90% or higher. The regimen remains helpful and she is without any associated side effects or issues as of late. She remains content with her response to medication management and wishes to continue without change. Her last UDS was positive for THC which she states is given to her from her PCP. There is no evidence of her filling medical marijuana on her . She was made aware that she must stop using marijuana in order to continue receiving prescriptions for OxyContin.  she voices understanding. A UDS will be performed at her next visit.

## 2022-11-16 NOTE — ASSESSMENT
[FreeTextEntry1] : This is a 63 yo F who suffers from chronic neck and lower back pain. She is on a stable medication regimen,  However she will need to discontinue use of marijuana in order to continue receiving prescriptions for OxyContin. Medication will be renewed and she will follow up in 4 weeks. A tox will be performed at her next visit. \par \par All this patients questions were answered and the conversation was understood well.\par \par I advised the patient they must keep their medication under a lock and key, or in a safe place away from children or other individuals. This medication given is solely for the patient and under no circumstances to be shared. Patient verbalized this and is in agreement with the aforementioned. I explained the risk of addiction, tolerance, and withdrawals. UDS will be done randomly and a drug agreement was signed.\par \par I explained the risk of addiction, tolerance and withdrawals. UDS will be done randomly and a drug agreement was signed.\par \par \par \par I, Apolonia Hickman, attest that this documentation has been prepared under the direction and in the presence of Provider Beatriz Herzog MD.\par \par \par Thank you for allowing me to assist in the management of this patient. \par \par \par Best Regards, \par \par \par Beatriz Herzog M.D., [facility]\par \par \par Diplomate, American Board of Physical Medicine and Rehabilitation\par Diplomate, American Board of Pain Medicine \par Diplomate, American Board of Pain Management\par

## 2022-11-28 ENCOUNTER — APPOINTMENT (OUTPATIENT)
Dept: INFECTIOUS DISEASE | Facility: CLINIC | Age: 62
End: 2022-11-28

## 2022-11-28 ENCOUNTER — OUTPATIENT (OUTPATIENT)
Dept: OUTPATIENT SERVICES | Facility: HOSPITAL | Age: 62
LOS: 1 days | Discharge: HOME | End: 2022-11-28

## 2022-11-28 ENCOUNTER — NON-APPOINTMENT (OUTPATIENT)
Age: 62
End: 2022-11-28

## 2022-11-28 VITALS
DIASTOLIC BLOOD PRESSURE: 70 MMHG | HEIGHT: 67 IN | OXYGEN SATURATION: 100 % | SYSTOLIC BLOOD PRESSURE: 121 MMHG | RESPIRATION RATE: 14 BRPM | BODY MASS INDEX: 21.35 KG/M2 | TEMPERATURE: 98.5 F | HEART RATE: 90 BPM | WEIGHT: 136 LBS

## 2022-11-28 DIAGNOSIS — B20 HUMAN IMMUNODEFICIENCY VIRUS [HIV] DISEASE: ICD-10-CM

## 2022-11-28 DIAGNOSIS — Z98.890 OTHER SPECIFIED POSTPROCEDURAL STATES: Chronic | ICD-10-CM

## 2022-11-28 PROCEDURE — 99214 OFFICE O/P EST MOD 30 MIN: CPT | Mod: GC

## 2022-11-28 RX ORDER — GABAPENTIN 300 MG/1
300 CAPSULE ORAL
Qty: 60 | Refills: 5 | Status: COMPLETED | COMMUNITY
Start: 2021-09-23 | End: 2022-11-28

## 2022-11-28 RX ORDER — ACETAMINOPHEN 500 MG/1
500 TABLET ORAL
Qty: 120 | Refills: 5 | Status: COMPLETED | COMMUNITY
Start: 2022-01-27 | End: 2022-11-28

## 2022-11-28 RX ORDER — DIPHENHYDRAMINE HCL 50 MG/1
50 CAPSULE ORAL EVERY 8 HOURS
Qty: 90 | Refills: 0 | Status: COMPLETED | COMMUNITY
Start: 2022-09-26 | End: 2022-11-28

## 2022-11-28 RX ORDER — DIPHENHYDRAMINE HCL 25 MG/1
25 CAPSULE ORAL
Qty: 30 | Refills: 2 | Status: COMPLETED | COMMUNITY
Start: 2021-09-23 | End: 2022-11-28

## 2022-11-29 ENCOUNTER — OUTPATIENT (OUTPATIENT)
Dept: OUTPATIENT SERVICES | Facility: HOSPITAL | Age: 62
LOS: 1 days | Discharge: HOME | End: 2022-11-29

## 2022-11-29 ENCOUNTER — NON-APPOINTMENT (OUTPATIENT)
Age: 62
End: 2022-11-29

## 2022-11-29 DIAGNOSIS — Z98.890 OTHER SPECIFIED POSTPROCEDURAL STATES: Chronic | ICD-10-CM

## 2022-12-08 ENCOUNTER — OUTPATIENT (OUTPATIENT)
Dept: OUTPATIENT SERVICES | Facility: HOSPITAL | Age: 62
LOS: 1 days | Discharge: HOME | End: 2022-12-08

## 2022-12-08 ENCOUNTER — NON-APPOINTMENT (OUTPATIENT)
Age: 62
End: 2022-12-08

## 2022-12-08 DIAGNOSIS — Z98.890 OTHER SPECIFIED POSTPROCEDURAL STATES: Chronic | ICD-10-CM

## 2022-12-15 ENCOUNTER — LABORATORY RESULT (OUTPATIENT)
Age: 62
End: 2022-12-15

## 2022-12-15 ENCOUNTER — APPOINTMENT (OUTPATIENT)
Dept: PAIN MANAGEMENT | Facility: CLINIC | Age: 62
End: 2022-12-15

## 2022-12-15 VITALS
HEIGHT: 67 IN | DIASTOLIC BLOOD PRESSURE: 70 MMHG | SYSTOLIC BLOOD PRESSURE: 121 MMHG | WEIGHT: 136 LBS | HEART RATE: 90 BPM | BODY MASS INDEX: 21.35 KG/M2

## 2022-12-15 LAB — PM AMPHETAMINE: NORMAL

## 2022-12-15 PROCEDURE — 99072 ADDL SUPL MATRL&STAF TM PHE: CPT

## 2022-12-15 PROCEDURE — 99214 OFFICE O/P EST MOD 30 MIN: CPT

## 2022-12-15 PROCEDURE — 80305 DRUG TEST PRSMV DIR OPT OBS: CPT | Mod: QW

## 2022-12-16 NOTE — HISTORY OF PRESENT ILLNESS
[FreeTextEntry1] : ORIGINAL PRESENTATION: Ms. Monroy is a 62-year-old woman who presents with a history of right-sided pain status post stroke. He also has a history of HIV neuropathy. She originally presented because her provider was away for a month and she needed to have her medications refilled. We have been following her for chronic pain ever since. She states her pain is moderate, constant, worse at night, dull/aching with numbness, pins and needles. She denies any weakness in the upper or lower extremities. She draws a corresponding pain diagram. She states that standing, sitting, walking, increases her pain. She denies any recent change in bowel or bladder habits. \par \par ACTIVITIES: She can walk two blocks before experiencing pain. She can sit for 20 minutes, stand for 30 minutes. In a day, she constantly lies down because of pain. She uses a walker to assist with ambulation. Because of her pain, she is unable to exercise. \par \par PRIOR PAIN TREATMENTS: Physical therapy, heat, and cold gave her no relief. \par \par PRIOR PAIN MEDICATIONS: Percocet, morphine (trouble breathing).\par \par PATIENT PRESENTS FOR FOLLOW UP: She presents for a revisit encounter. Ongoing chronic pain noted involving multiple areas. The use of OxyContin 20 mg BID along with gabapentin provides satisfactory relief with benefit at 90% or higher. The regimen remains helpful and she is without any associated side effects or issues as of late. She remains content with her response to medication management and wishes to continue without change. Her last UDS was positive for THC which she states is given to her from her PCP. There is no evidence of her filling medical marijuana on her . She was made aware that she must stop using marijuana in order to continue receiving prescriptions for OxyContin.  she voices understanding.

## 2022-12-16 NOTE — ASSESSMENT
[FreeTextEntry1] : This is a 61 yo F who suffers from chronic neck and lower back pain. She is on a stable medication regimen,  However she will need to discontinue use of marijuana in order to continue receiving prescriptions for OxyContin. Medication will be renewed and she will follow up in 4 weeks.  A UDS was repeated today and she was made aware that we cannot continue to prescribe medication if it is positive for THC. The rapid was positive therefore I will decrease her to Oxycontin 15 mg BID from 20 mg.  Jatin repeat tox next month.  Patient will follow up in 4 weeks for medication refill.  All this patients questions were answered and the conversation was understood well.\par \par I advised the patient they must keep their medication under a lock and key, or in a safe place away from children or other individuals. This medication given is solely for the patient and under no circumstances to be shared. Patient verbalized this and is in agreement with the aforementioned. I explained the risk of addiction, tolerance, and withdrawals. UDS will be done randomly and a drug agreement was signed.\par \par I explained the risk of addiction, tolerance and withdrawals. UDS will be done randomly and a drug agreement was signed.\par \par \par \par I, Apolonia Hickman, attest that this documentation has been prepared under the direction and in the presence of Provider Beatriz Herzog MD.\par \par \par Thank you for allowing me to assist in the management of this patient. \par \par \par Best Regards, \par \par \par Beatriz Herzog M.D., FAAPMR\par \par \par Diplomate, American Board of Physical Medicine and Rehabilitation\par Diplomate, American Board of Pain Medicine \par Diplomate, American Board of Pain Management\par

## 2022-12-16 NOTE — PHYSICAL EXAM
[Normal Coordination] : normal coordination [Normal DTR UE/LE] : normal DTR UE/LE  [Normal Sensation] : normal sensation [Normal Mood and Affect] : normal mood and affect [Orientated] : orientated [Able to Communicate] : able to communicate [Normal Skin] : normal skin [No obvious lymphadenopathy in areas examined] : no obvious lymphadenopathy in areas examined [Well Developed] : well developed [Well Nourished] : well nourished [Peripheral vascular exam is grossly normal] : peripheral vascular exam is grossly normal [No Respiratory Distress] : no respiratory distress [Diminished] : brachioradialis reflex diminished [] : no swelling

## 2022-12-16 NOTE — DATA REVIEWED
[FreeTextEntry1] : SOAPP: low on 11/16/22 \par Low risk: Patient has combination of a low risk SOAP and no risk factors. UDS would be repeated randomly every quarter.\par \par UDS:12/15/22 \par \par [place] REGISTRY: Checked\par \par \par \par

## 2022-12-28 DIAGNOSIS — B20 HUMAN IMMUNODEFICIENCY VIRUS [HIV] DISEASE: ICD-10-CM

## 2023-01-04 ENCOUNTER — APPOINTMENT (OUTPATIENT)
Dept: INFECTIOUS DISEASE | Facility: CLINIC | Age: 63
End: 2023-01-04
Payer: COMMERCIAL

## 2023-01-04 ENCOUNTER — OUTPATIENT (OUTPATIENT)
Dept: OUTPATIENT SERVICES | Facility: HOSPITAL | Age: 63
LOS: 1 days | Discharge: HOME | End: 2023-01-04

## 2023-01-04 DIAGNOSIS — R73.9 HYPERGLYCEMIA, UNSPECIFIED: ICD-10-CM

## 2023-01-04 DIAGNOSIS — B20 HUMAN IMMUNODEFICIENCY VIRUS [HIV] DISEASE: ICD-10-CM

## 2023-01-04 DIAGNOSIS — Z98.890 OTHER SPECIFIED POSTPROCEDURAL STATES: Chronic | ICD-10-CM

## 2023-01-04 PROCEDURE — ZZZZZ: CPT

## 2023-01-10 ENCOUNTER — OUTPATIENT (OUTPATIENT)
Dept: OUTPATIENT SERVICES | Facility: HOSPITAL | Age: 63
LOS: 1 days | Discharge: HOME | End: 2023-01-10

## 2023-01-10 ENCOUNTER — NON-APPOINTMENT (OUTPATIENT)
Age: 63
End: 2023-01-10

## 2023-01-10 DIAGNOSIS — Z98.890 OTHER SPECIFIED POSTPROCEDURAL STATES: Chronic | ICD-10-CM

## 2023-01-11 ENCOUNTER — OUTPATIENT (OUTPATIENT)
Dept: OUTPATIENT SERVICES | Facility: HOSPITAL | Age: 63
LOS: 1 days | Discharge: HOME | End: 2023-01-11

## 2023-01-11 ENCOUNTER — NON-APPOINTMENT (OUTPATIENT)
Age: 63
End: 2023-01-11

## 2023-01-11 DIAGNOSIS — Z98.890 OTHER SPECIFIED POSTPROCEDURAL STATES: Chronic | ICD-10-CM

## 2023-01-12 DIAGNOSIS — B20 HUMAN IMMUNODEFICIENCY VIRUS [HIV] DISEASE: ICD-10-CM

## 2023-01-17 ENCOUNTER — LABORATORY RESULT (OUTPATIENT)
Age: 63
End: 2023-01-17

## 2023-01-18 ENCOUNTER — APPOINTMENT (OUTPATIENT)
Dept: PAIN MANAGEMENT | Facility: CLINIC | Age: 63
End: 2023-01-18
Payer: COMMERCIAL

## 2023-01-18 ENCOUNTER — OUTPATIENT (OUTPATIENT)
Dept: OUTPATIENT SERVICES | Facility: HOSPITAL | Age: 63
LOS: 1 days | Discharge: HOME | End: 2023-01-18

## 2023-01-18 ENCOUNTER — APPOINTMENT (OUTPATIENT)
Dept: INFECTIOUS DISEASE | Facility: CLINIC | Age: 63
End: 2023-01-18
Payer: COMMERCIAL

## 2023-01-18 VITALS
WEIGHT: 136 LBS | HEART RATE: 82 BPM | DIASTOLIC BLOOD PRESSURE: 80 MMHG | BODY MASS INDEX: 21.35 KG/M2 | HEIGHT: 67 IN | SYSTOLIC BLOOD PRESSURE: 132 MMHG

## 2023-01-18 DIAGNOSIS — Z98.890 OTHER SPECIFIED POSTPROCEDURAL STATES: Chronic | ICD-10-CM

## 2023-01-18 DIAGNOSIS — B20 HUMAN IMMUNODEFICIENCY VIRUS [HIV] DISEASE: ICD-10-CM

## 2023-01-18 LAB — PM MDEA: NORMAL

## 2023-01-18 PROCEDURE — ZZZZZ: CPT

## 2023-01-18 PROCEDURE — 80305 DRUG TEST PRSMV DIR OPT OBS: CPT | Mod: QW

## 2023-01-18 PROCEDURE — 99213 OFFICE O/P EST LOW 20 MIN: CPT

## 2023-01-18 PROCEDURE — 99072 ADDL SUPL MATRL&STAF TM PHE: CPT

## 2023-01-18 NOTE — HISTORY OF PRESENT ILLNESS
[FreeTextEntry1] : ORIGINAL PRESENTATION: Ms. Monroy is a 62-year-old woman who presents with a history of right-sided pain status post stroke. He also has a history of HIV neuropathy. She originally presented because her provider was away for a month and she needed to have her medications refilled. We have been following her for chronic pain ever since. She states her pain is moderate, constant, worse at night, dull/aching with numbness, pins and needles. She denies any weakness in the upper or lower extremities. She draws a corresponding pain diagram. She states that standing, sitting, walking, increases her pain. She denies any recent change in bowel or bladder habits. \par \par ACTIVITIES: She can walk two blocks before experiencing pain. She can sit for 20 minutes, stand for 30 minutes. In a day, she constantly lies down because of pain. She uses a walker to assist with ambulation. Because of her pain, she is unable to exercise. \par \par PRIOR PAIN TREATMENTS: Physical therapy, heat, and cold gave her no relief. \par \par PRIOR PAIN MEDICATIONS: Percocet, morphine (trouble breathing).\par \par TODAY: I had the pleasure of seeing Ms. Monroy today in follow up.  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  At her last visit her oxycontin 20 mg BID was lowered to 15 mg BID as her UDS was inconsistent as it was positive for THC. She is aware we will not be increasing her dosage and may be further weaning her off her medication if her UDS continue to be inconsistent.  She states the current regimen provides her with 40% relief without side effects and she will undergo updated UDS testing today.

## 2023-01-18 NOTE — ASSESSMENT
[FreeTextEntry1] : 62 year old female with chronic pain.  We will continue to prescribe Oxycontin 15 mg BID and will f/u in 4 weeks.  We may lower her medication further but will see how she does over the next few weeks.  If there is any issue she will contact the office.\par \par I personally reviewed with the PA, this patient's history and physical exam findings, as documented above. I have discussed the relevant areas of concern, having direct implications to the presenting problems and illnesses, and I have personally examined all pertinent and positive and negative findings, which impact on the prior pain management treatment. \par \par \par Check of the  registry reveals compliance in regards to narcotic medication management use.\par \par Urine drug screening collected today with rapid sample result consistent with given regimen. Sample to be sent for confirmatory testing.\par \par \par \par Madisyn Rodriguez, MS, PA-C\par Beatriz Herzog MD\par

## 2023-01-20 ENCOUNTER — NON-APPOINTMENT (OUTPATIENT)
Age: 63
End: 2023-01-20

## 2023-01-26 ENCOUNTER — NON-APPOINTMENT (OUTPATIENT)
Age: 63
End: 2023-01-26

## 2023-01-26 ENCOUNTER — OUTPATIENT (OUTPATIENT)
Dept: OUTPATIENT SERVICES | Facility: HOSPITAL | Age: 63
LOS: 1 days | Discharge: HOME | End: 2023-01-26

## 2023-01-26 DIAGNOSIS — Z98.890 OTHER SPECIFIED POSTPROCEDURAL STATES: Chronic | ICD-10-CM

## 2023-01-27 DIAGNOSIS — B20 HUMAN IMMUNODEFICIENCY VIRUS [HIV] DISEASE: ICD-10-CM

## 2023-01-30 ENCOUNTER — OUTPATIENT (OUTPATIENT)
Dept: OUTPATIENT SERVICES | Facility: HOSPITAL | Age: 63
LOS: 1 days | Discharge: HOME | End: 2023-01-30

## 2023-01-30 ENCOUNTER — APPOINTMENT (OUTPATIENT)
Dept: INFECTIOUS DISEASE | Facility: CLINIC | Age: 63
End: 2023-01-30
Payer: COMMERCIAL

## 2023-01-30 ENCOUNTER — NON-APPOINTMENT (OUTPATIENT)
Age: 63
End: 2023-01-30

## 2023-01-30 VITALS
SYSTOLIC BLOOD PRESSURE: 137 MMHG | HEART RATE: 77 BPM | BODY MASS INDEX: 21.82 KG/M2 | HEIGHT: 67 IN | WEIGHT: 139 LBS | TEMPERATURE: 97.9 F | RESPIRATION RATE: 14 BRPM | OXYGEN SATURATION: 99 % | DIASTOLIC BLOOD PRESSURE: 75 MMHG

## 2023-01-30 DIAGNOSIS — B20 HUMAN IMMUNODEFICIENCY VIRUS [HIV] DISEASE: ICD-10-CM

## 2023-01-30 PROCEDURE — 99214 OFFICE O/P EST MOD 30 MIN: CPT | Mod: GC

## 2023-02-06 RX ORDER — ATOVAQUONE 750 MG/5ML
750 SUSPENSION ORAL DAILY
Qty: 1 | Refills: 5 | Status: COMPLETED | COMMUNITY
Start: 2021-12-08 | End: 2023-01-30

## 2023-02-07 ENCOUNTER — OUTPATIENT (OUTPATIENT)
Dept: OUTPATIENT SERVICES | Facility: HOSPITAL | Age: 63
LOS: 1 days | End: 2023-02-07
Payer: COMMERCIAL

## 2023-02-07 ENCOUNTER — NON-APPOINTMENT (OUTPATIENT)
Age: 63
End: 2023-02-07

## 2023-02-07 DIAGNOSIS — Z98.890 OTHER SPECIFIED POSTPROCEDURAL STATES: Chronic | ICD-10-CM

## 2023-02-07 PROCEDURE — G9012: CPT

## 2023-02-08 DIAGNOSIS — B20 HUMAN IMMUNODEFICIENCY VIRUS [HIV] DISEASE: ICD-10-CM

## 2023-02-16 ENCOUNTER — APPOINTMENT (OUTPATIENT)
Dept: PAIN MANAGEMENT | Facility: CLINIC | Age: 63
End: 2023-02-16
Payer: COMMERCIAL

## 2023-02-16 VITALS
BODY MASS INDEX: 21.82 KG/M2 | SYSTOLIC BLOOD PRESSURE: 158 MMHG | HEIGHT: 67 IN | DIASTOLIC BLOOD PRESSURE: 79 MMHG | HEART RATE: 81 BPM | WEIGHT: 139 LBS

## 2023-02-16 PROCEDURE — 99214 OFFICE O/P EST MOD 30 MIN: CPT

## 2023-02-16 PROCEDURE — 99072 ADDL SUPL MATRL&STAF TM PHE: CPT

## 2023-02-16 NOTE — DATA REVIEWED
[FreeTextEntry1] : SOAPP: low on 11/16/22 \par Low risk: Patient has combination of a low risk SOAP and no risk factors. UDS would be repeated randomly every quarter.\par \par UDS:12/15/22, repeated 01/18/23\par \par [place] REGISTRY: Checked\par \par \par \par

## 2023-02-16 NOTE — ASSESSMENT
[FreeTextEntry1] : 62 year old female with chronic pain.  Because of inconsistent tox screens in the past, we will decrease Oxycontin 15 mg BID to 10 mg twice daily and will f/u in 4 weeks.  We may lower her medication further but will see how she does over the next few weeks.  If there is any issue she will contact the office.\par \par Check of the  registry reveals compliance in regards to narcotic medication management use.\par \par Urine drug screening collected 01/18/23 with rapid sample result consistent with given regimen. Sample was sent for confirmatory testing. We are waiting for results so medication can be refilled. \par \par I advised the patient they must keep their medication under a lock and key, or in a safe place away from children or other individuals. This medication given is solely for the patient and under no circumstances to be shared. Patient verbalized this and is in agreement with the aforementioned. I explained the risk of addiction, tolerance, and withdrawals. UDS will be done randomly and a drug agreement was signed.\par \par I explained the risk of addiction, tolerance and withdrawals. UDS will be done randomly and a drug agreement was signed.\par \par \par I, Erik Hernandez, attest that this documentation has been prepared under the direction and in the presence of Provider Beatriz Herzog MD.\par \par \par Thank you for allowing me to assist in the management of this patient. \par \par \par Best Regards, \par \par \par Beatriz Herzog M.D., FAAPMR\par \par \par Diplomate, American Board of Physical Medicine and Rehabilitation\par Diplomate, American Board of Pain Medicine \par Diplomate, American Board of Pain Management\par

## 2023-02-16 NOTE — HISTORY OF PRESENT ILLNESS
[FreeTextEntry1] : ORIGINAL PRESENTATION: Ms. Monroy is a 62-year-old woman who presents with a history of right-sided pain status post stroke. He also has a history of HIV neuropathy. She originally presented because her provider was away for a month and she needed to have her medications refilled. We have been following her for chronic pain ever since. She states her pain is moderate, constant, worse at night, dull/aching with numbness, pins and needles. She denies any weakness in the upper or lower extremities. She draws a corresponding pain diagram. She states that standing, sitting, walking, increases her pain. She denies any recent change in bowel or bladder habits. \par \par ACTIVITIES: She can walk two blocks before experiencing pain. She can sit for 20 minutes, stand for 30 minutes. In a day, she constantly lies down because of pain. She uses a walker to assist with ambulation. Because of her pain, she is unable to exercise. \par \par PRIOR PAIN TREATMENTS: Physical therapy, heat, and cold gave her no relief. \par \par PRIOR PAIN MEDICATIONS: Percocet, morphine (trouble breathing).\par \par TODAY: I had the pleasure of seeing Ms. Monroy today in follow up.  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  At a previous visit 12/15/22 her oxycontin 20 mg BID was lowered to 15 mg BID as her UDS was inconsistent as it was positive for THC. She is aware we will not be increasing her dosage and may be further weaning her off her medication if her UDS continue to be inconsistent.  She states the current regimen provides her with 40% relief without side effects. We are waiting for updated UDS results from last visit. \par \par UDS from 12/15/2022 and 1/18/23 were obtained after patient's appointment and UDS from 12/15/2022 was inconsistent positive for morphine therefore I will decrease her OxyContin from 15 mg every 12 hours to 10 mg every 12 hours.\par

## 2023-02-17 DIAGNOSIS — B20 HUMAN IMMUNODEFICIENCY VIRUS [HIV] DISEASE: ICD-10-CM

## 2023-02-27 ENCOUNTER — APPOINTMENT (OUTPATIENT)
Dept: INFECTIOUS DISEASE | Facility: CLINIC | Age: 63
End: 2023-02-27
Payer: COMMERCIAL

## 2023-02-27 ENCOUNTER — APPOINTMENT (OUTPATIENT)
Dept: INFECTIOUS DISEASE | Facility: CLINIC | Age: 63
End: 2023-02-27

## 2023-02-27 ENCOUNTER — OUTPATIENT (OUTPATIENT)
Dept: OUTPATIENT SERVICES | Facility: HOSPITAL | Age: 63
LOS: 1 days | End: 2023-02-27
Payer: COMMERCIAL

## 2023-02-27 DIAGNOSIS — B20 HUMAN IMMUNODEFICIENCY VIRUS [HIV] DISEASE: ICD-10-CM

## 2023-02-27 DIAGNOSIS — Z98.890 OTHER SPECIFIED POSTPROCEDURAL STATES: Chronic | ICD-10-CM

## 2023-02-27 PROCEDURE — 99213 OFFICE O/P EST LOW 20 MIN: CPT

## 2023-02-27 PROCEDURE — ZZZZZ: CPT

## 2023-03-15 ENCOUNTER — APPOINTMENT (OUTPATIENT)
Dept: INFECTIOUS DISEASE | Facility: CLINIC | Age: 63
End: 2023-03-15

## 2023-03-15 ENCOUNTER — OUTPATIENT (OUTPATIENT)
Dept: OUTPATIENT SERVICES | Facility: HOSPITAL | Age: 63
LOS: 1 days | End: 2023-03-15
Payer: COMMERCIAL

## 2023-03-15 ENCOUNTER — APPOINTMENT (OUTPATIENT)
Dept: INFECTIOUS DISEASE | Facility: CLINIC | Age: 63
End: 2023-03-15
Payer: COMMERCIAL

## 2023-03-15 DIAGNOSIS — Z98.890 OTHER SPECIFIED POSTPROCEDURAL STATES: Chronic | ICD-10-CM

## 2023-03-15 DIAGNOSIS — B20 HUMAN IMMUNODEFICIENCY VIRUS [HIV] DISEASE: ICD-10-CM

## 2023-03-15 PROCEDURE — 99213 OFFICE O/P EST LOW 20 MIN: CPT

## 2023-03-15 PROCEDURE — ZZZZZ: CPT

## 2023-03-17 ENCOUNTER — APPOINTMENT (OUTPATIENT)
Dept: PAIN MANAGEMENT | Facility: CLINIC | Age: 63
End: 2023-03-17
Payer: COMMERCIAL

## 2023-03-17 VITALS
DIASTOLIC BLOOD PRESSURE: 83 MMHG | WEIGHT: 139 LBS | HEART RATE: 82 BPM | SYSTOLIC BLOOD PRESSURE: 155 MMHG | HEIGHT: 67 IN | BODY MASS INDEX: 21.82 KG/M2

## 2023-03-17 PROCEDURE — 99072 ADDL SUPL MATRL&STAF TM PHE: CPT

## 2023-03-17 PROCEDURE — 99213 OFFICE O/P EST LOW 20 MIN: CPT

## 2023-03-17 RX ORDER — OXYCODONE HYDROCHLORIDE 20 MG/1
20 TABLET, FILM COATED, EXTENDED RELEASE ORAL
Qty: 60 | Refills: 0 | Status: DISCONTINUED | COMMUNITY
Start: 2021-07-22 | End: 2023-03-17

## 2023-03-17 RX ORDER — OXYCODONE HYDROCHLORIDE 20 MG/1
20 TABLET, FILM COATED, EXTENDED RELEASE ORAL
Qty: 60 | Refills: 0 | Status: DISCONTINUED | COMMUNITY
Start: 2022-07-22 | End: 2023-03-17

## 2023-03-17 NOTE — PHYSICAL EXAM
[Normal Coordination] : normal coordination [Normal DTR UE/LE] : normal DTR UE/LE  [Normal Sensation] : normal sensation [Normal Mood and Affect] : normal mood and affect [Orientated] : orientated [Able to Communicate] : able to communicate [Normal Skin] : normal skin [No obvious lymphadenopathy in areas examined] : no obvious lymphadenopathy in areas examined [Well Developed] : well developed [Well Nourished] : well nourished [Peripheral vascular exam is grossly normal] : peripheral vascular exam is grossly normal [No Respiratory Distress] : no respiratory distress [Diminished] : brachioradialis reflex diminished [Flexion] : flexion [] : no swelling

## 2023-03-17 NOTE — HISTORY OF PRESENT ILLNESS
[FreeTextEntry1] : ORIGINAL PRESENTATION: Ms. Monroy is a 62-year-old woman who presents with a history of right-sided pain status post stroke. He also has a history of HIV neuropathy. She originally presented because her provider was away for a month and she needed to have her medications refilled. We have been following her for chronic pain ever since. She states her pain is moderate, constant, worse at night, dull/aching with numbness, pins and needles. She denies any weakness in the upper or lower extremities. She draws a corresponding pain diagram. She states that standing, sitting, walking, increases her pain. She denies any recent change in bowel or bladder habits. \par \par ACTIVITIES: She can walk two blocks before experiencing pain. She can sit for 20 minutes, stand for 30 minutes. In a day, she constantly lies down because of pain. She uses a walker to assist with ambulation. Because of her pain, she is unable to exercise. \par \par PRIOR PAIN TREATMENTS: Physical therapy, heat, and cold gave her no relief. 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  At a previous visit 02/16/2023 her Oxycontin 15 mg BID was lowered to 10 mg BID. She is aware we will not be increasing her dosage and may be further weaning her off her medication if her UDS continue to be inconsistent.  She states the current regimen provides her with 40% relief without side effects. \par \par UDS will be repeated on the next visit.\par

## 2023-03-24 LAB
ALBUMIN SERPL ELPH-MCNC: 4.4 G/DL
ALBUMIN SERPL ELPH-MCNC: 4.8 G/DL
ALP BLD-CCNC: 183 U/L
ALP BLD-CCNC: 233 U/L
ALT SERPL-CCNC: 6 U/L
ALT SERPL-CCNC: 8 U/L
ANION GAP SERPL CALC-SCNC: 12 MMOL/L
ANION GAP SERPL CALC-SCNC: 13 MMOL/L
AST SERPL-CCNC: 15 U/L
AST SERPL-CCNC: 16 U/L
BASOPHILS # BLD AUTO: 0.01 K/UL
BASOPHILS # BLD AUTO: 0.04 K/UL
BASOPHILS NFR BLD AUTO: 0.1 %
BASOPHILS NFR BLD AUTO: 0.9 %
BILIRUB SERPL-MCNC: 0.3 MG/DL
BILIRUB SERPL-MCNC: <0.2 MG/DL
BUN SERPL-MCNC: 11 MG/DL
BUN SERPL-MCNC: 24 MG/DL
CALCIUM SERPL-MCNC: 10.3 MG/DL
CALCIUM SERPL-MCNC: 9.7 MG/DL
CD16+CD56+ CELLS # BLD: 120 /UL
CD16+CD56+ CELLS # BLD: 235 /UL
CD16+CD56+ CELLS NFR BLD: 14 %
CD16+CD56+ CELLS NFR BLD: 8 %
CD19 CELLS NFR BLD: 388 /UL
CD19 CELLS NFR BLD: 390 /UL
CD3 CELLS # BLD: 1041 /UL
CD3 CELLS # BLD: 994 /UL
CD3 CELLS NFR BLD: 61 %
CD3 CELLS NFR BLD: 66 %
CD3+CD4+ CELLS # BLD: 276 /UL
CD3+CD4+ CELLS # BLD: 292 /UL
CD3+CD4+ CELLS NFR BLD: 18 %
CD3+CD4+ CELLS NFR BLD: 18 %
CD3+CD4+ CELLS/CD3+CD8+ CLL SPEC: 0.39 RATIO
CD3+CD4+ CELLS/CD3+CD8+ CLL SPEC: 0.45 RATIO
CD3+CD8+ CELLS # SPEC: 647 /UL
CD3+CD8+ CELLS # SPEC: 714 /UL
CD3+CD8+ CELLS NFR BLD: 40 %
CD3+CD8+ CELLS NFR BLD: 46 %
CELLS.CD3-CD19+/CELLS IN BLOOD: 24 %
CELLS.CD3-CD19+/CELLS IN BLOOD: 25 %
CHLORIDE SERPL-SCNC: 103 MMOL/L
CHLORIDE SERPL-SCNC: 104 MMOL/L
CHOLEST SERPL-MCNC: 198 MG/DL
CHOLEST SERPL-MCNC: 211 MG/DL
CO2 SERPL-SCNC: 22 MMOL/L
CO2 SERPL-SCNC: 27 MMOL/L
CREAT SERPL-MCNC: 1.1 MG/DL
CREAT SERPL-MCNC: 1.3 MG/DL
EGFR: 47 ML/MIN/1.73M2
EGFR: 58 ML/MIN/1.73M2
EOSINOPHIL # BLD AUTO: 0 K/UL
EOSINOPHIL # BLD AUTO: 0.05 K/UL
EOSINOPHIL NFR BLD AUTO: 0 %
EOSINOPHIL NFR BLD AUTO: 1.1 %
ESTIMATED AVERAGE GLUCOSE: 108 MG/DL
ESTIMATED AVERAGE GLUCOSE: 97 MG/DL
GLUCOSE SERPL-MCNC: 90 MG/DL
GLUCOSE SERPL-MCNC: 92 MG/DL
HBA1C MFR BLD HPLC: 5 %
HBA1C MFR BLD HPLC: 5.4 %
HCT VFR BLD CALC: 39.1 %
HCT VFR BLD CALC: 43.7 %
HDLC SERPL-MCNC: 62 MG/DL
HDLC SERPL-MCNC: 63 MG/DL
HGB BLD-MCNC: 11.6 G/DL
HGB BLD-MCNC: 13.5 G/DL
HIV1 RNA # SERPL NAA+PROBE: 39 COPIES/ML
HIV1 RNA # SERPL NAA+PROBE: <20 COPIES/ML
IMM GRANULOCYTES NFR BLD AUTO: 0.4 %
IMM GRANULOCYTES NFR BLD AUTO: 0.5 %
LDLC SERPL CALC-MCNC: 117 MG/DL
LDLC SERPL CALC-MCNC: 125 MG/DL
LYMPHOCYTES # BLD AUTO: 1.71 K/UL
LYMPHOCYTES # BLD AUTO: 1.82 K/UL
LYMPHOCYTES NFR BLD AUTO: 17.9 %
LYMPHOCYTES NFR BLD AUTO: 38.6 %
MAN DIFF?: NORMAL
MAN DIFF?: NORMAL
MCHC RBC-ENTMCNC: 28.9 PG
MCHC RBC-ENTMCNC: 29.5 PG
MCHC RBC-ENTMCNC: 29.7 G/DL
MCHC RBC-ENTMCNC: 30.9 G/DL
MCV RBC AUTO: 95.6 FL
MCV RBC AUTO: 97.3 FL
MONOCYTES # BLD AUTO: 0.31 K/UL
MONOCYTES # BLD AUTO: 0.53 K/UL
MONOCYTES NFR BLD AUTO: 5.2 %
MONOCYTES NFR BLD AUTO: 7 %
NEUTROPHILS # BLD AUTO: 2.3 K/UL
NEUTROPHILS # BLD AUTO: 7.77 K/UL
NEUTROPHILS NFR BLD AUTO: 51.9 %
NEUTROPHILS NFR BLD AUTO: 76.4 %
NONHDLC SERPL-MCNC: 136 MG/DL
NONHDLC SERPL-MCNC: 148 MG/DL
PLATELET # BLD AUTO: 313 K/UL
PLATELET # BLD AUTO: 326 K/UL
POTASSIUM SERPL-SCNC: 4.6 MMOL/L
POTASSIUM SERPL-SCNC: 5.1 MMOL/L
PROT SERPL-MCNC: 7.4 G/DL
PROT SERPL-MCNC: 7.5 G/DL
RBC # BLD: 4.02 M/UL
RBC # BLD: 4.57 M/UL
RBC # FLD: 14 %
RBC # FLD: 14.1 %
SODIUM SERPL-SCNC: 139 MMOL/L
SODIUM SERPL-SCNC: 142 MMOL/L
T PALLIDUM AB SER QL IA: NEGATIVE
T PALLIDUM AB SER QL IA: NEGATIVE
TRIGL SERPL-MCNC: 113 MG/DL
TRIGL SERPL-MCNC: 95 MG/DL
VIRAL LOAD INTERP: DETECTED COPIES/ML
VIRAL LOAD INTERP: DETECTED COPIES/ML
VIRAL LOAD LOG: 1.59 LOGCOPIES/ML
VIRAL LOAD LOG: <1.3 LOGCOPIES/ML
WBC # FLD AUTO: 10.17 K/UL
WBC # FLD AUTO: 4.43 K/UL

## 2023-04-14 ENCOUNTER — LABORATORY RESULT (OUTPATIENT)
Age: 63
End: 2023-04-14

## 2023-04-14 ENCOUNTER — APPOINTMENT (OUTPATIENT)
Dept: PAIN MANAGEMENT | Facility: CLINIC | Age: 63
End: 2023-04-14
Payer: COMMERCIAL

## 2023-04-14 VITALS — WEIGHT: 139 LBS | BODY MASS INDEX: 21.82 KG/M2 | HEIGHT: 67 IN

## 2023-04-14 DIAGNOSIS — M54.50 LOW BACK PAIN, UNSPECIFIED: ICD-10-CM

## 2023-04-14 PROCEDURE — 99072 ADDL SUPL MATRL&STAF TM PHE: CPT

## 2023-04-14 PROCEDURE — 99213 OFFICE O/P EST LOW 20 MIN: CPT

## 2023-04-14 PROCEDURE — 80305 DRUG TEST PRSMV DIR OPT OBS: CPT | Mod: QW

## 2023-04-14 NOTE — REASON FOR VISIT
[Follow-Up: _____] : a [unfilled] follow-up visit [Follow-Up Visit] : a follow-up pain management visit [FreeTextEntry1] : REFILL [FreeTextEntry2] : med refill

## 2023-04-14 NOTE — ASSESSMENT
[FreeTextEntry1] : 62 year old female with chronic pain secondary to HIV neuropathy.  We will continue to prescribe Oxycontin 10 mg BID  twice daily and will f/u in 4 weeks.  If there is any issue she will contact the office. \par \par I personally reviewed with the PA, this patient's history and physical exam findings, as documented above. I have discussed the relevant areas of concern, having direct implications to the presenting problems and illnesses, and I have personally examined all pertinent and positive and negative findings, which impact on the prior pain management treatment. \par \par \par Check of the  registry reveals compliance in regards to narcotic medication management use.\par \par Urine drug screening collected today with rapid sample result consistent with given regimen. Sample to be sent for confirmatory testing.\par \par I advised the patient they must keep their medication under a lock and key, or in a safe place away from children or other individuals. This medication given is solely for the patient and under no circumstances to be shared. Patient verbalized this and is in agreement with the aforementioned. I explained the risk of addiction, tolerance, and withdrawals. UDS will be done randomly and a drug agreement was signed.\par \par I explained the risk of addiction, tolerance and withdrawals. UDS will be done randomly and a drug agreement was signed.\par \par \par Madisyn Rodriguez, MS, LUI\par Beatriz Herzog M.D., [facility]\par \par \par \par

## 2023-04-14 NOTE — HISTORY OF PRESENT ILLNESS
[FreeTextEntry1] : ORIGINAL PRESENTATION: Ms. Monroy is a 62-year-old woman who presents with a history of right-sided pain status post stroke. He also has a history of HIV neuropathy. She originally presented because her provider was away for a month and she needed to have her medications refilled. We have been following her for chronic pain ever since. She states her pain is moderate, constant, worse at night, dull/aching with numbness, pins and needles. She denies any weakness in the upper or lower extremities. She draws a corresponding pain diagram. She states that standing, sitting, walking, increases her pain. She denies any recent change in bowel or bladder habits. \par \par ACTIVITIES: She can walk two blocks before experiencing pain. She can sit for 20 minutes, stand for 30 minutes. In a day, she constantly lies down because of pain. She uses a walker to assist with ambulation. Because of her pain, she is unable to exercise. \par \par PRIOR PAIN TREATMENTS: Physical therapy, heat, and cold gave her no relief. \par \par PRIOR PAIN MEDICATIONS: Percocet, morphine (trouble breathing).\par \par TODAY: I had the pleasure of seeing Ms. Monroy today in follow up.   Her previous history and physical findings have been reviewed.\par \par She is under our care chronic pain secondary to HIV neuropathy which she is receiving continuing active treatment for.  She continues to remain stable on a regimen of oxycodone ER 10 mg bid which provides her with at least 50-60% relief allowing her to function and perform ADLs without side effects. She is pleased with the results she is getting and we will continue as is without change.  She will undergo updated UDS testing today.

## 2023-04-18 LAB
PM 6 MAM: NEGATIVE NG/ML
PM 7-AMINO-CLONAZ: NEGATIVE NG/ML
PM ALPHA-HYDROXY-ALPRAZOLAM: NEGATIVE NG/ML
PM ALPHA-HYDROXY-MIDAZOLAM: NEGATIVE NG/ML
PM ALPRAZOLAM: NEGATIVE NG/ML
PM AMOBARBITAL: NEGATIVE NG/ML
PM AMPHETAMINE INTERP: NEGATIVE
PM AMPHETAMINE: NEGATIVE NG/ML
PM BARBURATES INTERP: NEGATIVE
PM BEG: NEGATIVE NG/ML
PM BENZODIAZEPINES INTERP: NEGATIVE
PM BUPRENORPHINE INTERP: NEGATIVE
PM BUPRENORPHINE: NEGATIVE NG/ML
PM BUTALBITAL: NEGATIVE NG/ML
PM CLONAZEPAM: NEGATIVE NG/ML
PM COCAINE INTERP: NEGATIVE
PM COCAINE: NEGATIVE NG/ML
PM CODIENE: NEGATIVE NG/ML
PM COTININE: >1000 NG/ML
PM DIAZEPAM: NEGATIVE NG/ML
PM DIHYROCODEINE: NEGATIVE NG/ML
PM EDDP: NEGATIVE NG/ML
PM FENTANYL INTERP: NEGATIVE
PM FENTANYL: NEGATIVE NG/ML
PM FLUNITRAZEPAM: NEGATIVE NG/ML
PM FLURAZEPAM: NEGATIVE NG/ML
PM HYDROCODONE: NEGATIVE NG/ML
PM HYDROMORPHONE: NEGATIVE NG/ML
PM LORAZEPAM: NEGATIVE NG/ML
PM MARIJUANA (DELTA-9-THC): NEGATIVE NG/ML
PM MARIJUANA INTERP: NEGATIVE
PM MDA: NEGATIVE NG/ML
PM MDEA: NEGATIVE NG/ML
PM MDMA: NEGATIVE NG/ML
PM MEPERIDINE: NEGATIVE NG/ML
PM METHADONE INTERP: NEGATIVE
PM METHADONE: NEGATIVE NG/ML
PM METHAMPHETAMINE: NEGATIVE NG/ML
PM MIDAZOLAM: NEGATIVE NG/ML
PM MORPHINE: NEGATIVE NG/ML
PM NALOXONE: NEGATIVE NG/ML
PM NALTREXONE: NEGATIVE NG/ML
PM NICOTINE INTERP: POSITIVE
PM NORBUPRENORPHINE: NEGATIVE NG/ML
PM NORDIAZEPAM: NEGATIVE NG/ML
PM NORMEPERIDINE: NEGATIVE NG/ML
PM NOROXYCODONE: 260 NG/ML
PM OPIOID INTERP: NEGATIVE
PM OXAZEPAM: NEGATIVE NG/ML
PM OXXYCODONE INTERP: POSITIVE
PM OXYCODONE: >1000 NG/ML
PM OXYMORPHONE: NEGATIVE NG/ML
PM PCP: NEGATIVE NG/ML
PM PHENCYCLIDINE INTERP: NEGATIVE
PM PHENOBARBITAL: NEGATIVE NG/ML
PM PPX: NEGATIVE NG/ML
PM PROPOXYPHENE INTERP: NEGATIVE
PM SECOBARBITAL: NEGATIVE NG/ML
PM SUFENTANIL: NEGATIVE NG/ML
PM TAPENTADOL: NEGATIVE NG/ML
PM TEMAZEPAM: NEGATIVE NG/ML
PM TRAMADOL INTERP: NEGATIVE
PM TRAMADOL: NEGATIVE NG/ML

## 2023-04-19 ENCOUNTER — NON-APPOINTMENT (OUTPATIENT)
Age: 63
End: 2023-04-19

## 2023-05-10 ENCOUNTER — OUTPATIENT (OUTPATIENT)
Dept: OUTPATIENT SERVICES | Facility: HOSPITAL | Age: 63
LOS: 1 days | End: 2023-05-10
Payer: COMMERCIAL

## 2023-05-10 ENCOUNTER — APPOINTMENT (OUTPATIENT)
Dept: INFECTIOUS DISEASE | Facility: CLINIC | Age: 63
End: 2023-05-10
Payer: COMMERCIAL

## 2023-05-10 DIAGNOSIS — B20 HUMAN IMMUNODEFICIENCY VIRUS [HIV] DISEASE: ICD-10-CM

## 2023-05-10 DIAGNOSIS — Z98.890 OTHER SPECIFIED POSTPROCEDURAL STATES: Chronic | ICD-10-CM

## 2023-05-10 PROCEDURE — ZZZZZ: CPT

## 2023-05-10 PROCEDURE — 99213 OFFICE O/P EST LOW 20 MIN: CPT

## 2023-05-22 ENCOUNTER — OUTPATIENT (OUTPATIENT)
Dept: OUTPATIENT SERVICES | Facility: HOSPITAL | Age: 63
LOS: 1 days | End: 2023-05-22
Payer: COMMERCIAL

## 2023-05-22 ENCOUNTER — APPOINTMENT (OUTPATIENT)
Dept: INFECTIOUS DISEASE | Facility: CLINIC | Age: 63
End: 2023-05-22
Payer: COMMERCIAL

## 2023-05-22 ENCOUNTER — NON-APPOINTMENT (OUTPATIENT)
Age: 63
End: 2023-05-22

## 2023-05-22 DIAGNOSIS — B20 HUMAN IMMUNODEFICIENCY VIRUS [HIV] DISEASE: ICD-10-CM

## 2023-05-22 DIAGNOSIS — Z98.890 OTHER SPECIFIED POSTPROCEDURAL STATES: Chronic | ICD-10-CM

## 2023-05-22 PROCEDURE — 99213 OFFICE O/P EST LOW 20 MIN: CPT

## 2023-05-22 PROCEDURE — ZZZZZ: CPT

## 2023-05-25 LAB
ALBUMIN SERPL ELPH-MCNC: 4.6 G/DL
ALP BLD-CCNC: 207 U/L
ALT SERPL-CCNC: 11 U/L
ANION GAP SERPL CALC-SCNC: 12 MMOL/L
AST SERPL-CCNC: 15 U/L
BASOPHILS # BLD AUTO: 0.03 K/UL
BASOPHILS NFR BLD AUTO: 0.6 %
BILIRUB SERPL-MCNC: 0.4 MG/DL
BUN SERPL-MCNC: 11 MG/DL
CALCIUM SERPL-MCNC: 10.1 MG/DL
CD16+CD56+ CELLS # BLD: 217 /UL
CD16+CD56+ CELLS NFR BLD: 12 %
CD19 CELLS NFR BLD: 563 /UL
CD3 CELLS # BLD: 1055 /UL
CD3 CELLS NFR BLD: 57 %
CD3+CD4+ CELLS # BLD: 328 /UL
CD3+CD4+ CELLS NFR BLD: 18 %
CD3+CD4+ CELLS/CD3+CD8+ CLL SPEC: 0.49 RATIO
CD3+CD8+ CELLS # SPEC: 669 /UL
CD3+CD8+ CELLS NFR BLD: 36 %
CELLS.CD3-CD19+/CELLS IN BLOOD: 30 %
CHLORIDE SERPL-SCNC: 103 MMOL/L
CHOLEST SERPL-MCNC: 240 MG/DL
CO2 SERPL-SCNC: 25 MMOL/L
CREAT SERPL-MCNC: 1.1 MG/DL
EGFR: 58 ML/MIN/1.73M2
EOSINOPHIL # BLD AUTO: 0.07 K/UL
EOSINOPHIL NFR BLD AUTO: 1.3 %
ESTIMATED AVERAGE GLUCOSE: 103 MG/DL
GLUCOSE SERPL-MCNC: 90 MG/DL
HBA1C MFR BLD HPLC: 5.2 %
HCT VFR BLD CALC: 43.8 %
HDLC SERPL-MCNC: 67 MG/DL
HGB BLD-MCNC: 13.6 G/DL
HIV1 RNA # SERPL NAA+PROBE: <20 COPIES/ML
IMM GRANULOCYTES NFR BLD AUTO: 0.2 %
LDLC SERPL CALC-MCNC: 153 MG/DL
LYMPHOCYTES # BLD AUTO: 1.86 K/UL
LYMPHOCYTES NFR BLD AUTO: 35.8 %
MAN DIFF?: NORMAL
MCHC RBC-ENTMCNC: 29.1 PG
MCHC RBC-ENTMCNC: 31.1 G/DL
MCV RBC AUTO: 93.6 FL
MONOCYTES # BLD AUTO: 0.45 K/UL
MONOCYTES NFR BLD AUTO: 8.7 %
NEUTROPHILS # BLD AUTO: 2.77 K/UL
NEUTROPHILS NFR BLD AUTO: 53.4 %
NONHDLC SERPL-MCNC: 173 MG/DL
PLATELET # BLD AUTO: 277 K/UL
POTASSIUM SERPL-SCNC: 5 MMOL/L
PROT SERPL-MCNC: 7.5 G/DL
RBC # BLD: 4.68 M/UL
RBC # FLD: 14.2 %
SODIUM SERPL-SCNC: 140 MMOL/L
T PALLIDUM AB SER QL IA: NEGATIVE
TRIGL SERPL-MCNC: 100 MG/DL
VIRAL LOAD INTERP: DETECTED COPIES/ML
VIRAL LOAD LOG: <1.3 LOGCOPIES/ML
WBC # FLD AUTO: 5.19 K/UL

## 2023-05-30 ENCOUNTER — APPOINTMENT (OUTPATIENT)
Dept: PAIN MANAGEMENT | Facility: CLINIC | Age: 63
End: 2023-05-30
Payer: COMMERCIAL

## 2023-05-30 VITALS — HEIGHT: 67 IN | WEIGHT: 139 LBS | BODY MASS INDEX: 21.82 KG/M2

## 2023-05-30 PROCEDURE — 99213 OFFICE O/P EST LOW 20 MIN: CPT

## 2023-05-30 NOTE — ASSESSMENT
[FreeTextEntry1] : 62 year old female with chronic pain secondary to HIV neuropathy.  We will continue to prescribe Oxycodone ER 10 mg BID  and will f/u in 4 weeks.  If there is any issue she will contact the office. \par \par Check of the  registry reveals compliance in regards to narcotic medication management use.\par UDS from 04\par \par \par I advised the patient they must keep their medication under a lock and key, or in a safe place away from children or other individuals. This medication given is solely for the patient and under no circumstances to be shared. Patient verbalized this and is in agreement with the aforementioned. I explained the risk of addiction, tolerance, and withdrawals. UDS will be done randomly and a drug agreement was signed.\par \par I explained the risk of addiction, tolerance and withdrawals. UDS will be done randomly and a drug agreement was signed.\par \par \par Sandor Benitez PA-C\par Beatriz Herzog M.D., FAAPMR\par \par \par \par

## 2023-05-30 NOTE — HISTORY OF PRESENT ILLNESS
[FreeTextEntry1] : ORIGINAL PRESENTATION: Ms. Monroy is a 62-year-old woman who presents with a history of right-sided pain status post stroke. He also has a history of HIV neuropathy. She originally presented because her provider was away for a month and she needed to have her medications refilled. We have been following her for chronic pain ever since. She states her pain is moderate, constant, worse at night, dull/aching with numbness, pins and needles. She denies any weakness in the upper or lower extremities. She draws a corresponding pain diagram. She states that standing, sitting, walking, increases her pain. She denies any recent change in bowel or bladder habits. \par \par ACTIVITIES: She can walk two blocks before experiencing pain. She can sit for 20 minutes, stand for 30 minutes. In a day, she constantly lies down because of pain. She uses a walker to assist with ambulation. Because of her pain, she is unable to exercise. \par \par PRIOR PAIN TREATMENTS: Physical therapy, heat, and cold gave her no relief. \par \par PRIOR PAIN MEDICATIONS: Percocet, morphine (trouble breathing).\par \par TODAY: Last seen on 04/14/2023 and since then there has been no new complaints or acute changes to her condition.\par She is under our care chronic pain secondary to HIV neuropathy which she is receiving continuing active treatment for.  She continues to remain stable on a regimen of Oxycodone ER 10 mg BID which provides her with at least 50-60% relief allowing her to function and perform ADLs without side effects. She is pleased with the results she is getting and we will continue as is without change.  \par UDS from 04/14/2023 is consistent.

## 2023-05-30 NOTE — REASON FOR VISIT
[Follow-Up Visit] : a follow-up pain management visit [Follow-Up: _____] : a [unfilled] follow-up visit [FreeTextEntry2] : med refill  [FreeTextEntry1] : REFILL

## 2023-06-07 ENCOUNTER — APPOINTMENT (OUTPATIENT)
Dept: INFECTIOUS DISEASE | Facility: CLINIC | Age: 63
End: 2023-06-07
Payer: COMMERCIAL

## 2023-06-07 ENCOUNTER — OUTPATIENT (OUTPATIENT)
Dept: OUTPATIENT SERVICES | Facility: HOSPITAL | Age: 63
LOS: 1 days | End: 2023-06-07
Payer: COMMERCIAL

## 2023-06-07 DIAGNOSIS — Z98.890 OTHER SPECIFIED POSTPROCEDURAL STATES: Chronic | ICD-10-CM

## 2023-06-07 DIAGNOSIS — B20 HUMAN IMMUNODEFICIENCY VIRUS [HIV] DISEASE: ICD-10-CM

## 2023-06-07 PROCEDURE — 99213 OFFICE O/P EST LOW 20 MIN: CPT

## 2023-06-07 PROCEDURE — ZZZZZ: CPT

## 2023-06-21 ENCOUNTER — OUTPATIENT (OUTPATIENT)
Dept: OUTPATIENT SERVICES | Facility: HOSPITAL | Age: 63
LOS: 1 days | End: 2023-06-21
Payer: COMMERCIAL

## 2023-06-21 ENCOUNTER — APPOINTMENT (OUTPATIENT)
Dept: INFECTIOUS DISEASE | Facility: CLINIC | Age: 63
End: 2023-06-21
Payer: COMMERCIAL

## 2023-06-21 VITALS
RESPIRATION RATE: 16 BRPM | SYSTOLIC BLOOD PRESSURE: 140 MMHG | HEIGHT: 67 IN | BODY MASS INDEX: 21.35 KG/M2 | WEIGHT: 136 LBS | TEMPERATURE: 98 F | OXYGEN SATURATION: 98 % | HEART RATE: 78 BPM | DIASTOLIC BLOOD PRESSURE: 76 MMHG

## 2023-06-21 DIAGNOSIS — B20 HUMAN IMMUNODEFICIENCY VIRUS [HIV] DISEASE: ICD-10-CM

## 2023-06-21 DIAGNOSIS — Z98.890 OTHER SPECIFIED POSTPROCEDURAL STATES: Chronic | ICD-10-CM

## 2023-06-21 PROCEDURE — 99214 OFFICE O/P EST MOD 30 MIN: CPT

## 2023-06-21 RX ORDER — EZETIMIBE 10 MG/1
10 TABLET ORAL
Qty: 30 | Refills: 3 | Status: ACTIVE | COMMUNITY
Start: 2023-05-22 | End: 1900-01-01

## 2023-06-21 RX ORDER — PHENYLEPHRINE HCL 10 MG
7 TABLET ORAL DAILY
Qty: 2 | Refills: 0 | Status: ACTIVE | COMMUNITY
Start: 2021-09-23 | End: 1900-01-01

## 2023-06-21 RX ORDER — NALOXONE HYDROCHLORIDE 4 MG/.1ML
4 SPRAY NASAL
Qty: 1 | Refills: 1 | Status: ACTIVE | COMMUNITY
Start: 2023-04-11 | End: 1900-01-01

## 2023-06-21 RX ORDER — ALBUTEROL SULFATE 90 UG/1
108 (90 BASE) INHALANT RESPIRATORY (INHALATION) EVERY 4 HOURS
Qty: 1 | Refills: 3 | Status: ACTIVE | COMMUNITY
Start: 2022-09-26 | End: 1900-01-01

## 2023-06-21 RX ORDER — ERGOCALCIFEROL 1.25 MG/1
1.25 MG CAPSULE, LIQUID FILLED ORAL
Qty: 4 | Refills: 3 | Status: ACTIVE | COMMUNITY
Start: 2021-08-26 | End: 1900-01-01

## 2023-06-28 ENCOUNTER — APPOINTMENT (OUTPATIENT)
Dept: INFECTIOUS DISEASE | Facility: CLINIC | Age: 63
End: 2023-06-28
Payer: COMMERCIAL

## 2023-06-28 ENCOUNTER — OUTPATIENT (OUTPATIENT)
Dept: OUTPATIENT SERVICES | Facility: HOSPITAL | Age: 63
LOS: 1 days | End: 2023-06-28
Payer: COMMERCIAL

## 2023-06-28 DIAGNOSIS — Z98.890 OTHER SPECIFIED POSTPROCEDURAL STATES: Chronic | ICD-10-CM

## 2023-06-28 DIAGNOSIS — B20 HUMAN IMMUNODEFICIENCY VIRUS [HIV] DISEASE: ICD-10-CM

## 2023-06-28 PROCEDURE — ZZZZZ: CPT

## 2023-06-28 PROCEDURE — 99213 OFFICE O/P EST LOW 20 MIN: CPT

## 2023-06-30 ENCOUNTER — APPOINTMENT (OUTPATIENT)
Dept: PAIN MANAGEMENT | Facility: CLINIC | Age: 63
End: 2023-06-30
Payer: COMMERCIAL

## 2023-06-30 VITALS — WEIGHT: 136 LBS | BODY MASS INDEX: 21.35 KG/M2 | HEIGHT: 67 IN

## 2023-06-30 LAB
ALBUMIN SERPL ELPH-MCNC: 4.8 G/DL
ALP BLD-CCNC: 227 U/L
ALT SERPL-CCNC: 8 U/L
ANION GAP SERPL CALC-SCNC: 12 MMOL/L
AST SERPL-CCNC: 19 U/L
BILIRUB SERPL-MCNC: 0.3 MG/DL
BUN SERPL-MCNC: 16 MG/DL
CALCIUM SERPL-MCNC: 10 MG/DL
CHLORIDE SERPL-SCNC: 105 MMOL/L
CHOLEST SERPL-MCNC: 231 MG/DL
CO2 SERPL-SCNC: 23 MMOL/L
CREAT SERPL-MCNC: 1.5 MG/DL
EGFR: 40 ML/MIN/1.73M2
GLUCOSE SERPL-MCNC: 92 MG/DL
HDLC SERPL-MCNC: 51 MG/DL
LDLC SERPL CALC-MCNC: 145 MG/DL
NONHDLC SERPL-MCNC: 180 MG/DL
POTASSIUM SERPL-SCNC: NORMAL MMOL/L
PROT SERPL-MCNC: 7.9 G/DL
SODIUM SERPL-SCNC: 140 MMOL/L
TRIGL SERPL-MCNC: 176 MG/DL

## 2023-06-30 PROCEDURE — 99213 OFFICE O/P EST LOW 20 MIN: CPT

## 2023-06-30 NOTE — HISTORY OF PRESENT ILLNESS
[FreeTextEntry1] : ORIGINAL PRESENTATION: Ms. Monroy is a 62-year-old woman who presents with a history of right-sided pain status post stroke. He also has a history of HIV neuropathy. She originally presented because her provider was away for a month and she needed to have her medications refilled. We have been following her for chronic pain ever since. She states her pain is moderate, constant, worse at night, dull/aching with numbness, pins and needles. She denies any weakness in the upper or lower extremities. She draws a corresponding pain diagram. She states that standing, sitting, walking, increases her pain. She denies any recent change in bowel or bladder habits. \par \par ACTIVITIES: She can walk two blocks before experiencing pain. She can sit for 20 minutes, stand for 30 minutes. In a day, she constantly lies down because of pain. She uses a walker to assist with ambulation. Because of her pain, she is unable to exercise. \par \par PRIOR PAIN TREATMENTS: Physical therapy, heat, and cold gave her no relief. 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 She states the current regimen provides her with 40% relief without side effects. \par \par UDS from 04/14/2023 is consistent.\par

## 2023-06-30 NOTE — ASSESSMENT
[FreeTextEntry1] : 62 year old female with chronic pain. Patient is stable on the current regimen of Oxycontin 10mg BID which provides at least 45% pain relief and allows her to perform ADLs. We will continue with no changes. Patient will return for re-evaluation in 4-6 weeks. If there is any issue she will contact the office.\par \par Check of the  registry reveals compliance in regards to narcotic medication management use.\par \par UDS from 04/14/2023 is consistent.\par \par I advised the patient they must keep their medication under a lock and key, or in a safe place away from children or other individuals. This medication given is solely for the patient and under no circumstances to be shared. Patient verbalized this and is in agreement with the aforementioned. I explained the risk of addiction, tolerance, and withdrawals. UDS will be done randomly and a drug agreement was signed.\par \par I explained the risk of addiction, tolerance and withdrawals. UDS will be done randomly and a drug agreement was signed.\par \par \par Sandor Benitez PA-C\par Beatriz Herzog M.D., [facility]\par \par \par \par

## 2023-07-17 ENCOUNTER — APPOINTMENT (OUTPATIENT)
Dept: INFECTIOUS DISEASE | Facility: CLINIC | Age: 63
End: 2023-07-17

## 2023-07-20 ENCOUNTER — APPOINTMENT (OUTPATIENT)
Dept: INTERNAL MEDICINE | Facility: CLINIC | Age: 63
End: 2023-07-20

## 2023-08-11 ENCOUNTER — RX RENEWAL (OUTPATIENT)
Age: 63
End: 2023-08-11

## 2023-08-16 ENCOUNTER — OUTPATIENT (OUTPATIENT)
Dept: OUTPATIENT SERVICES | Facility: HOSPITAL | Age: 63
LOS: 1 days | End: 2023-08-16
Payer: COMMERCIAL

## 2023-08-16 ENCOUNTER — APPOINTMENT (OUTPATIENT)
Dept: INFECTIOUS DISEASE | Facility: CLINIC | Age: 63
End: 2023-08-16
Payer: COMMERCIAL

## 2023-08-16 DIAGNOSIS — Z98.890 OTHER SPECIFIED POSTPROCEDURAL STATES: Chronic | ICD-10-CM

## 2023-08-16 DIAGNOSIS — B20 HUMAN IMMUNODEFICIENCY VIRUS [HIV] DISEASE: ICD-10-CM

## 2023-08-16 PROCEDURE — 99213 OFFICE O/P EST LOW 20 MIN: CPT

## 2023-08-16 PROCEDURE — ZZZZZ: CPT

## 2023-08-18 ENCOUNTER — APPOINTMENT (OUTPATIENT)
Dept: PAIN MANAGEMENT | Facility: CLINIC | Age: 63
End: 2023-08-18
Payer: COMMERCIAL

## 2023-08-18 VITALS
HEIGHT: 67 IN | SYSTOLIC BLOOD PRESSURE: 116 MMHG | BODY MASS INDEX: 21.35 KG/M2 | HEART RATE: 101 BPM | DIASTOLIC BLOOD PRESSURE: 70 MMHG | WEIGHT: 136 LBS

## 2023-08-18 PROCEDURE — 99213 OFFICE O/P EST LOW 20 MIN: CPT

## 2023-08-18 NOTE — PHYSICAL EXAM
[Normal Coordination] : normal coordination [Normal DTR UE/LE] : normal DTR UE/LE  [Normal Sensation] : normal sensation [Normal Mood and Affect] : normal mood and affect [Orientated] : orientated [Able to Communicate] : able to communicate [Normal Skin] : normal skin [No obvious lymphadenopathy in areas examined] : no obvious lymphadenopathy in areas examined [Well Developed] : well developed [Well Nourished] : well nourished [Peripheral vascular exam is grossly normal] : peripheral vascular exam is grossly normal [No Respiratory Distress] : no respiratory distress [Diminished] : brachioradialis reflex diminished [Flexion] : flexion [] : uses walker

## 2023-08-18 NOTE — HISTORY OF PRESENT ILLNESS
[FreeTextEntry1] : ORIGINAL PRESENTATION: Ms. Monroy is a 62-year-old woman who presents with a history of right-sided pain status post stroke. He also has a history of HIV neuropathy. She originally presented because her provider was away for a month and she needed to have her medications refilled. We have been following her for chronic pain ever since. She states her pain is moderate, constant, worse at night, dull/aching with numbness, pins and needles. She denies any weakness in the upper or lower extremities. She draws a corresponding pain diagram. She states that standing, sitting, walking, increases her pain. She denies any recent change in bowel or bladder habits.   ACTIVITIES: She can walk two blocks before experiencing pain. She can sit for 20 minutes, stand for 30 minutes. In a day, she constantly lies down because of pain. She uses a walker to assist with ambulation. Because of her pain, she is unable to exercise.   PRIOR PAIN TREATMENTS: Physical therapy, heat, and cold gave her no relief.   PRIOR PAIN MEDICATIONS: Percocet, morphine (trouble breathing).  TODAY: Last seen on 06/30/2023 and since then there has been no new complaints or acute changes. 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 She states the current regimen provides her with 40% relief without side effects.  Patient reports that she is experiencing side effects from Oxycodone ER, feeling "woozy" and request to be switched to Immediate release formulation. UDS from 04/14/2023 is consistent.

## 2023-08-24 ENCOUNTER — APPOINTMENT (OUTPATIENT)
Dept: INTERNAL MEDICINE | Facility: CLINIC | Age: 63
End: 2023-08-24

## 2023-09-05 ENCOUNTER — NON-APPOINTMENT (OUTPATIENT)
Age: 63
End: 2023-09-05

## 2023-09-05 RX ORDER — ATORVASTATIN CALCIUM 20 MG/1
20 TABLET, FILM COATED ORAL
Qty: 30 | Refills: 3 | Status: DISCONTINUED | COMMUNITY
Start: 2021-09-23 | End: 2023-09-05

## 2023-09-13 ENCOUNTER — OUTPATIENT (OUTPATIENT)
Dept: OUTPATIENT SERVICES | Facility: HOSPITAL | Age: 63
LOS: 1 days | End: 2023-09-13
Payer: COMMERCIAL

## 2023-09-13 ENCOUNTER — APPOINTMENT (OUTPATIENT)
Dept: INFECTIOUS DISEASE | Facility: CLINIC | Age: 63
End: 2023-09-13
Payer: COMMERCIAL

## 2023-09-13 VITALS
BODY MASS INDEX: 21.35 KG/M2 | RESPIRATION RATE: 15 BRPM | OXYGEN SATURATION: 100 % | DIASTOLIC BLOOD PRESSURE: 74 MMHG | TEMPERATURE: 97.8 F | HEART RATE: 66 BPM | SYSTOLIC BLOOD PRESSURE: 116 MMHG | WEIGHT: 136 LBS | HEIGHT: 67 IN

## 2023-09-13 VITALS
OXYGEN SATURATION: 97 % | WEIGHT: 136 LBS | HEART RATE: 81 BPM | HEIGHT: 67 IN | DIASTOLIC BLOOD PRESSURE: 82 MMHG | RESPIRATION RATE: 14 BRPM | TEMPERATURE: 98.6 F | BODY MASS INDEX: 21.35 KG/M2 | SYSTOLIC BLOOD PRESSURE: 134 MMHG

## 2023-09-13 DIAGNOSIS — Z98.890 OTHER SPECIFIED POSTPROCEDURAL STATES: Chronic | ICD-10-CM

## 2023-09-13 DIAGNOSIS — B20 HUMAN IMMUNODEFICIENCY VIRUS [HIV] DISEASE: ICD-10-CM

## 2023-09-13 PROCEDURE — 99214 OFFICE O/P EST MOD 30 MIN: CPT

## 2023-09-15 ENCOUNTER — APPOINTMENT (OUTPATIENT)
Dept: PAIN MANAGEMENT | Facility: CLINIC | Age: 63
End: 2023-09-15
Payer: COMMERCIAL

## 2023-09-15 PROCEDURE — 99213 OFFICE O/P EST LOW 20 MIN: CPT

## 2023-09-20 ENCOUNTER — OUTPATIENT (OUTPATIENT)
Dept: OUTPATIENT SERVICES | Facility: HOSPITAL | Age: 63
LOS: 1 days | End: 2023-09-20
Payer: COMMERCIAL

## 2023-09-20 DIAGNOSIS — Z98.890 OTHER SPECIFIED POSTPROCEDURAL STATES: Chronic | ICD-10-CM

## 2023-09-20 PROCEDURE — G9012: CPT

## 2023-09-22 DIAGNOSIS — B20 HUMAN IMMUNODEFICIENCY VIRUS [HIV] DISEASE: ICD-10-CM

## 2023-09-25 ENCOUNTER — NON-APPOINTMENT (OUTPATIENT)
Age: 63
End: 2023-09-25

## 2023-10-11 ENCOUNTER — NON-APPOINTMENT (OUTPATIENT)
Age: 63
End: 2023-10-11

## 2023-10-11 ENCOUNTER — OUTPATIENT (OUTPATIENT)
Dept: OUTPATIENT SERVICES | Facility: HOSPITAL | Age: 63
LOS: 1 days | End: 2023-10-11
Payer: COMMERCIAL

## 2023-10-11 ENCOUNTER — APPOINTMENT (OUTPATIENT)
Dept: INFECTIOUS DISEASE | Facility: CLINIC | Age: 63
End: 2023-10-11
Payer: COMMERCIAL

## 2023-10-11 VITALS
OXYGEN SATURATION: 99 % | HEART RATE: 60 BPM | WEIGHT: 136 LBS | SYSTOLIC BLOOD PRESSURE: 122 MMHG | TEMPERATURE: 98.5 F | HEIGHT: 67 IN | DIASTOLIC BLOOD PRESSURE: 75 MMHG | RESPIRATION RATE: 14 BRPM | BODY MASS INDEX: 21.35 KG/M2

## 2023-10-11 DIAGNOSIS — B20 HUMAN IMMUNODEFICIENCY VIRUS [HIV] DISEASE: ICD-10-CM

## 2023-10-11 DIAGNOSIS — Z98.890 OTHER SPECIFIED POSTPROCEDURAL STATES: Chronic | ICD-10-CM

## 2023-10-11 PROCEDURE — 99214 OFFICE O/P EST MOD 30 MIN: CPT

## 2023-10-11 RX ORDER — ROSUVASTATIN CALCIUM 20 MG/1
20 TABLET, FILM COATED ORAL DAILY
Qty: 30 | Refills: 2 | Status: ACTIVE | COMMUNITY
Start: 2023-09-05 | End: 1900-01-01

## 2023-10-13 ENCOUNTER — APPOINTMENT (OUTPATIENT)
Dept: PAIN MANAGEMENT | Facility: CLINIC | Age: 63
End: 2023-10-13
Payer: COMMERCIAL

## 2023-10-13 VITALS — WEIGHT: 143 LBS | HEIGHT: 67 IN | BODY MASS INDEX: 22.44 KG/M2

## 2023-10-13 PROCEDURE — 99213 OFFICE O/P EST LOW 20 MIN: CPT

## 2023-10-13 RX ORDER — OXYCODONE HYDROCHLORIDE 10 MG/1
10 TABLET, EXTENDED RELEASE ORAL
Qty: 60 | Refills: 0 | Status: DISCONTINUED | COMMUNITY
Start: 2022-12-15 | End: 2023-10-13

## 2023-11-08 ENCOUNTER — APPOINTMENT (OUTPATIENT)
Dept: INFECTIOUS DISEASE | Facility: CLINIC | Age: 63
End: 2023-11-08
Payer: COMMERCIAL

## 2023-11-08 ENCOUNTER — OUTPATIENT (OUTPATIENT)
Dept: OUTPATIENT SERVICES | Facility: HOSPITAL | Age: 63
LOS: 1 days | End: 2023-11-08
Payer: COMMERCIAL

## 2023-11-08 DIAGNOSIS — Z98.890 OTHER SPECIFIED POSTPROCEDURAL STATES: Chronic | ICD-10-CM

## 2023-11-08 DIAGNOSIS — B20 HUMAN IMMUNODEFICIENCY VIRUS [HIV] DISEASE: ICD-10-CM

## 2023-11-08 PROCEDURE — 99213 OFFICE O/P EST LOW 20 MIN: CPT

## 2023-11-08 PROCEDURE — ZZZZZ: CPT

## 2023-11-08 PROCEDURE — 90834 PSYTX W PT 45 MINUTES: CPT

## 2023-11-16 RX ORDER — NIFEDIPINE 60 MG/1
60 TABLET, EXTENDED RELEASE ORAL DAILY
Qty: 30 | Refills: 3 | Status: ACTIVE | COMMUNITY
Start: 2022-09-07 | End: 1900-01-01

## 2023-11-16 RX ORDER — CLONIDINE HYDROCHLORIDE 0.2 MG/1
0.2 TABLET ORAL DAILY
Qty: 30 | Refills: 3 | Status: ACTIVE | COMMUNITY
Start: 2022-03-03 | End: 1900-01-01

## 2023-11-16 RX ORDER — METOPROLOL SUCCINATE 50 MG/1
50 TABLET, EXTENDED RELEASE ORAL
Qty: 30 | Refills: 3 | Status: ACTIVE | COMMUNITY
Start: 2021-12-08 | End: 1900-01-01

## 2023-11-16 RX ORDER — DOLUTEGRAVIR SODIUM 50 MG/1
50 TABLET, FILM COATED ORAL DAILY
Qty: 30 | Refills: 3 | Status: ACTIVE | COMMUNITY
Start: 2021-11-29 | End: 1900-01-01

## 2023-11-20 ENCOUNTER — APPOINTMENT (OUTPATIENT)
Dept: PAIN MANAGEMENT | Facility: CLINIC | Age: 63
End: 2023-11-20
Payer: COMMERCIAL

## 2023-11-20 PROCEDURE — 99214 OFFICE O/P EST MOD 30 MIN: CPT

## 2023-11-27 ENCOUNTER — OUTPATIENT (OUTPATIENT)
Dept: OUTPATIENT SERVICES | Facility: HOSPITAL | Age: 63
LOS: 1 days | End: 2023-11-27
Payer: COMMERCIAL

## 2023-11-27 ENCOUNTER — APPOINTMENT (OUTPATIENT)
Dept: INFECTIOUS DISEASE | Facility: CLINIC | Age: 63
End: 2023-11-27
Payer: COMMERCIAL

## 2023-11-27 DIAGNOSIS — B20 HUMAN IMMUNODEFICIENCY VIRUS [HIV] DISEASE: ICD-10-CM

## 2023-11-27 PROCEDURE — 99213 OFFICE O/P EST LOW 20 MIN: CPT

## 2023-11-27 PROCEDURE — ZZZZZ: CPT

## 2023-12-07 ENCOUNTER — APPOINTMENT (OUTPATIENT)
Dept: INTERNAL MEDICINE | Facility: CLINIC | Age: 63
End: 2023-12-07

## 2023-12-18 ENCOUNTER — APPOINTMENT (OUTPATIENT)
Dept: PAIN MANAGEMENT | Facility: CLINIC | Age: 63
End: 2023-12-18
Payer: COMMERCIAL

## 2023-12-18 VITALS — BODY MASS INDEX: 21.03 KG/M2 | HEIGHT: 67 IN | WEIGHT: 134 LBS

## 2023-12-18 DIAGNOSIS — G89.4 CHRONIC PAIN SYNDROME: ICD-10-CM

## 2023-12-18 DIAGNOSIS — M79.604 PAIN IN RIGHT LEG: ICD-10-CM

## 2023-12-18 DIAGNOSIS — M25.559 PAIN IN UNSPECIFIED HIP: ICD-10-CM

## 2023-12-18 DIAGNOSIS — G63 HUMAN IMMUNODEFICIENCY VIRUS [HIV] DISEASE: ICD-10-CM

## 2023-12-18 DIAGNOSIS — I99.8 PAIN IN RIGHT LEG: ICD-10-CM

## 2023-12-18 DIAGNOSIS — M54.2 CERVICALGIA: ICD-10-CM

## 2023-12-18 DIAGNOSIS — M54.16 RADICULOPATHY, LUMBAR REGION: ICD-10-CM

## 2023-12-18 DIAGNOSIS — B20 HUMAN IMMUNODEFICIENCY VIRUS [HIV] DISEASE: ICD-10-CM

## 2023-12-18 PROCEDURE — 99213 OFFICE O/P EST LOW 20 MIN: CPT

## 2023-12-18 RX ORDER — OXYCODONE 5 MG/1
5 TABLET ORAL TWICE DAILY
Qty: 60 | Refills: 0 | Status: ACTIVE | COMMUNITY
Start: 2023-08-18 | End: 1900-01-01

## 2023-12-18 NOTE — ASSESSMENT
[FreeTextEntry1] : 63 year old female with chronic pain. We will prescribe Oxycodone 5mg BID. She is aware that this is her last script for Oxycodone as she wishes to establish care with a different pain management office.   Check of the  registry reveals compliance in regards to narcotic medication management use.  I advised the patient they must keep their medication under a lock and key, or in a safe place away from children or other individuals. This medication given is solely for the patient and under no circumstances to be shared. Patient verbalized this and is in agreement with the aforementioned. I explained the risk of addiction, tolerance, and withdrawals. UDS will be done randomly and a drug agreement was signed.  I explained the risk of addiction, tolerance and withdrawals. UDS will be done randomly and a drug agreement was signed.  Total clinician time spent today on the patient is 30 minutes including preparing to see the patient, obtaining and/or reviewing and confirming history, performing medically necessary and appropriate examination, counseling and educating the patient and/or family, documenting clinical information in the EHR and communicating and/or referring to other healthcare professionals.  LUI Brar MD

## 2023-12-18 NOTE — HISTORY OF PRESENT ILLNESS
[FreeTextEntry1] : ORIGINAL PRESENTATION: Ms. Monroy is a 63-year-old woman who presents with a history of right-sided pain status post stroke. He also has a history of HIV neuropathy. She originally presented because her provider was away for a month and she needed to have her medications refilled. We have been following her for chronic pain ever since. She states her pain is moderate, constant, worse at night, dull/aching with numbness, pins and needles. She denies any weakness in the upper or lower extremities. She draws a corresponding pain diagram. She states that standing, sitting, walking, increases her pain. She denies any recent change in bowel or bladder habits.   ACTIVITIES: She can walk two blocks before experiencing pain. She can sit for 20 minutes, stand for 30 minutes. In a day, she constantly lies down because of pain. She uses a walker to assist with ambulation. Because of her pain, she is unable to exercise.   PRIOR PAIN TREATMENTS: Physical therapy, heat, and cold gave her no relief.   PRIOR PAIN MEDICATIONS: Percocet, morphine (trouble breathing).  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 She states the current regimen provides her with 40% relief without side effects. She was previously utilizing Oxycodone IR 10mg BID which we titrated down to Oxycodone 5mg BID last visit. With this change she feel her pain increased and does not wish to wean down further. She wishes to establish care with another pain management practice.

## 2024-01-04 ENCOUNTER — NON-APPOINTMENT (OUTPATIENT)
Age: 64
End: 2024-01-04

## 2024-01-05 ENCOUNTER — NON-APPOINTMENT (OUTPATIENT)
Age: 64
End: 2024-01-05

## 2024-01-08 ENCOUNTER — APPOINTMENT (OUTPATIENT)
Dept: INFECTIOUS DISEASE | Facility: CLINIC | Age: 64
End: 2024-01-08

## 2024-01-08 ENCOUNTER — NON-APPOINTMENT (OUTPATIENT)
Age: 64
End: 2024-01-08

## 2024-01-08 RX ORDER — EMTRICITABINE AND TENOFOVIR ALAFENAMIDE 200; 25 MG/1; MG/1
200-25 TABLET ORAL DAILY
Qty: 30 | Refills: 2 | Status: ACTIVE | COMMUNITY
Start: 2024-01-08 | End: 1900-01-01

## 2024-01-08 RX ORDER — AMOXICILLIN AND CLAVULANATE POTASSIUM 875; 125 MG/1; MG/1
875-125 TABLET, COATED ORAL
Qty: 20 | Refills: 0 | Status: DISCONTINUED | COMMUNITY
Start: 2023-06-21 | End: 2024-01-08

## 2024-01-08 RX ORDER — DARUNAVIR ETHANOLATE AND COBICISTAT 800; 150 MG/1; MG/1
800-150 TABLET, FILM COATED ORAL DAILY
Qty: 30 | Refills: 3 | Status: ACTIVE | COMMUNITY
Start: 2021-11-29 | End: 1900-01-01

## 2024-01-08 RX ORDER — GUAIFENESIN AND DEXTROMETHORPHAN HYDROBROMIDE 600; 30 MG/1; MG/1
30-600 TABLET, EXTENDED RELEASE ORAL
Qty: 1 | Refills: 0 | Status: DISCONTINUED | COMMUNITY
Start: 2023-03-15 | End: 2024-01-08

## 2024-01-08 RX ORDER — PREDNISONE 20 MG/1
20 TABLET ORAL DAILY
Qty: 14 | Refills: 0 | Status: DISCONTINUED | COMMUNITY
Start: 2023-06-21 | End: 2024-01-08

## 2024-01-09 ENCOUNTER — NON-APPOINTMENT (OUTPATIENT)
Age: 64
End: 2024-01-09

## 2024-01-11 ENCOUNTER — NON-APPOINTMENT (OUTPATIENT)
Age: 64
End: 2024-01-11

## 2024-01-17 ENCOUNTER — NON-APPOINTMENT (OUTPATIENT)
Age: 64
End: 2024-01-17

## 2024-01-17 ENCOUNTER — OUTPATIENT (OUTPATIENT)
Dept: OUTPATIENT SERVICES | Facility: HOSPITAL | Age: 64
LOS: 1 days | End: 2024-01-17
Payer: COMMERCIAL

## 2024-01-17 DIAGNOSIS — Z98.890 OTHER SPECIFIED POSTPROCEDURAL STATES: Chronic | ICD-10-CM

## 2024-01-17 PROCEDURE — G9012: CPT

## 2024-01-19 DIAGNOSIS — B20 HUMAN IMMUNODEFICIENCY VIRUS [HIV] DISEASE: ICD-10-CM

## 2024-01-21 ENCOUNTER — NON-APPOINTMENT (OUTPATIENT)
Age: 64
End: 2024-01-21

## 2024-01-24 ENCOUNTER — APPOINTMENT (OUTPATIENT)
Dept: INFECTIOUS DISEASE | Facility: CLINIC | Age: 64
End: 2024-01-24

## 2024-01-31 NOTE — ASSESSMENT
[FreeTextEntry1] : 35 minutes was spent on medical discussion re: medication compliance. On antiretroviral treatment. Patient with HIV.  Will continue antiretroviral treatment. HIV resistance archive ordered TM 4 weeks

## 2024-01-31 NOTE — ASSESSMENT
[FreeTextEntry1] : 35 minutes was spent on medical discussion re: medication compliance. On antiretroviral treatment. Patient with HIV.  Will continue antiretroviral treatment. consult rosibel MONEG In person visit in 4 weeks on a Monday

## 2024-01-31 NOTE — ASSESSMENT
[FreeTextEntry1] : 35 minutes was spent on medical discussion re: medication compliance. On antiretroviral treatment. Patient with HIV.  Will continue antiretroviral treatment. pt will do genosure archive TM 5 weeks

## 2024-01-31 NOTE — ASSESSMENT
[FreeTextEntry1] : 35 minutes was spent on medical discussion re: medication compliance. On antiretroviral treatment. Patient with HIV.  Will continue antiretroviral treatment. Increase Crestor dose In person visit in 4 weeks for lipid profile

## 2024-04-19 LAB
ALBUMIN SERPL ELPH-MCNC: 4.4 G/DL
ALP BLD-CCNC: 174 U/L
ALT SERPL-CCNC: 8 U/L
ANION GAP SERPL CALC-SCNC: 11 MMOL/L
AST SERPL-CCNC: 18 U/L
BILIRUB SERPL-MCNC: 0.3 MG/DL
BUN SERPL-MCNC: 15 MG/DL
CALCIUM SERPL-MCNC: 9.7 MG/DL
CD16+CD56+ CELLS # BLD: 210 /UL
CD16+CD56+ CELLS NFR BLD: 12 %
CD19 CELLS NFR BLD: 443 /UL
CD3 CELLS # BLD: 1115 /UL
CD3 CELLS NFR BLD: 62 %
CD3+CD4+ CELLS # BLD: 370 /UL
CD3+CD4+ CELLS NFR BLD: 21 %
CD3+CD4+ CELLS/CD3+CD8+ CLL SPEC: 0.53 RATIO
CD3+CD8+ CELLS # SPEC: 703 /UL
CD3+CD8+ CELLS NFR BLD: 39 %
CELLS.CD3-CD19+/CELLS IN BLOOD: 25 %
CHLORIDE SERPL-SCNC: 107 MMOL/L
CHOLEST SERPL-MCNC: 172 MG/DL
CO2 SERPL-SCNC: 24 MMOL/L
CREAT SERPL-MCNC: 1.4 MG/DL
EGFR: 43 ML/MIN/1.73M2
ESTIMATED AVERAGE GLUCOSE: 105 MG/DL
GLUCOSE SERPL-MCNC: 94 MG/DL
HBA1C MFR BLD HPLC: 5.3 %
HCT VFR BLD CALC: 40.6 %
HDLC SERPL-MCNC: 55 MG/DL
HGB BLD-MCNC: 12.6 G/DL
HIV GENOSURE ARCHIVE 1: NORMAL
HIV1 PROVIR DNA RT + PR + IN MUT DET SEQ: NORMAL
HIV1 PROVIRAL DNA GENTYP BLD MC NAR: NORMAL
HIV1 RNA # SERPL NAA+PROBE: 38 COPIES/ML
LDLC SERPL CALC-MCNC: 93 MG/DL
MCHC RBC-ENTMCNC: 29.9 PG
MCHC RBC-ENTMCNC: 31 G/DL
MCV RBC AUTO: 96.2 FL
NONHDLC SERPL-MCNC: 117 MG/DL
PLATELET # BLD AUTO: 298 K/UL
PMV BLD: 11.4 FL
POTASSIUM SERPL-SCNC: 5.5 MMOL/L
PROT SERPL-MCNC: 7.2 G/DL
RBC # BLD: 4.22 M/UL
RBC # FLD: 14.2 %
SODIUM SERPL-SCNC: 142 MMOL/L
T PALLIDUM AB SER QL IA: NEGATIVE
TRIGL SERPL-MCNC: 122 MG/DL
VIRAL LOAD INTERP: DETECTED
VIRAL LOAD LOG: 1.58 LOGCOPIES/ML
WBC # FLD AUTO: 3.8 K/UL

## 2024-05-29 ENCOUNTER — OUTPATIENT (OUTPATIENT)
Dept: OUTPATIENT SERVICES | Facility: HOSPITAL | Age: 64
LOS: 1 days | End: 2024-05-29
Payer: COMMERCIAL

## 2024-05-29 DIAGNOSIS — Z12.31 ENCOUNTER FOR SCREENING MAMMOGRAM FOR MALIGNANT NEOPLASM OF BREAST: ICD-10-CM

## 2024-05-29 DIAGNOSIS — Z98.890 OTHER SPECIFIED POSTPROCEDURAL STATES: Chronic | ICD-10-CM

## 2024-05-29 PROCEDURE — 77063 BREAST TOMOSYNTHESIS BI: CPT

## 2024-05-29 PROCEDURE — 77067 SCR MAMMO BI INCL CAD: CPT

## 2024-05-29 PROCEDURE — 77063 BREAST TOMOSYNTHESIS BI: CPT | Mod: 26

## 2024-05-29 PROCEDURE — 77067 SCR MAMMO BI INCL CAD: CPT | Mod: 26

## 2024-05-30 DIAGNOSIS — Z12.31 ENCOUNTER FOR SCREENING MAMMOGRAM FOR MALIGNANT NEOPLASM OF BREAST: ICD-10-CM

## 2024-06-13 ENCOUNTER — APPOINTMENT (OUTPATIENT)
Dept: SURGERY | Facility: CLINIC | Age: 64
End: 2024-06-13

## 2024-07-02 ENCOUNTER — APPOINTMENT (OUTPATIENT)
Dept: SURGERY | Facility: CLINIC | Age: 64
End: 2024-07-02
Payer: COMMERCIAL

## 2024-07-02 VITALS
DIASTOLIC BLOOD PRESSURE: 70 MMHG | SYSTOLIC BLOOD PRESSURE: 122 MMHG | HEIGHT: 67 IN | BODY MASS INDEX: 19.62 KG/M2 | WEIGHT: 125 LBS

## 2024-07-02 DIAGNOSIS — A63.0 ANOGENITAL (VENEREAL) WARTS: ICD-10-CM

## 2024-07-02 PROCEDURE — 46600 DIAGNOSTIC ANOSCOPY SPX: CPT

## 2024-07-02 RX ORDER — IMIQUIMOD 50 MG/G
5 CREAM TOPICAL DAILY
Qty: 1 | Refills: 1 | Status: ACTIVE | COMMUNITY
Start: 2024-07-02 | End: 1900-01-01

## 2024-08-12 ENCOUNTER — APPOINTMENT (OUTPATIENT)
Dept: CARDIOTHORACIC SURGERY | Facility: CLINIC | Age: 64
End: 2024-08-12
Payer: COMMERCIAL

## 2024-08-12 VITALS — HEIGHT: 67 IN | BODY MASS INDEX: 19.62 KG/M2 | WEIGHT: 125 LBS

## 2024-08-12 DIAGNOSIS — F17.210 NICOTINE DEPENDENCE, CIGARETTES, UNCOMPLICATED: ICD-10-CM

## 2024-08-12 PROCEDURE — G0296 VISIT TO DETERM LDCT ELIG: CPT

## 2024-08-12 NOTE — PLAN
[FreeTextEntry1] : Plan: -Low Dose CT chest for lung cancer screening -Follow up with patient and her referring provider after her LDCT results have been reviewed by the multi-disciplinary clinical team -Encouraged smoking cessation -[facility] Smokers Quitline literature shared with patient and Staying Smoke Free brochure reviewed -Smoking Cessation was offered, states will try on her own, does not want patches  Should I Screen? tool utilized. 6 year risk of lung cancer is 1.5%. Patient wishes to proceed with screening.  Engaged in shared decision making with Ms. TARA GOODMAN . Answered all questions. She verbalized understanding and agreement. She knows to call back with any questions or concerns

## 2024-08-12 NOTE — HISTORY OF PRESENT ILLNESS
[Current] : Current [>=20 Pack-Years] : Twenty pack years or greater smoking history: Yes [TextBox_13] : Referred by Dr. Singh.  Ms. TARA GOODMAN  is a 63 year old woman with a history of CVA , HIV.  She  was seen in the office by  for review of eligibility for, as well as, discussion of Low-Dose CT lung cancer screening program. Over the telephone today we reviewed and confirmed that the patient meets screening eligibility criteria: -Age: 63 year  Smoking status: -Current smoker -Number of pack(s) per day: 0.5 -Number of years smoked: 40 -Number of pack years smokin  Ms. GOODMAN denies any signs or symptoms of lung cancer including new cough, change in cough, hemoptysis and unintentional weight loss.   Ms. GOODMAN denies any personal history of lung cancer. No lung cancer in a 1st degree relative. Denies any history of lung disease. Denies any history of occupational exposures.  [PacksperDay] : 0.5 [N_Years] : 40 [PacksperYear] : 20

## 2024-09-03 ENCOUNTER — APPOINTMENT (OUTPATIENT)
Dept: SURGERY | Facility: CLINIC | Age: 64
End: 2024-09-03

## 2024-10-22 ENCOUNTER — NON-APPOINTMENT (OUTPATIENT)
Age: 64
End: 2024-10-22

## 2024-10-23 ENCOUNTER — APPOINTMENT (OUTPATIENT)
Dept: OBGYN | Facility: CLINIC | Age: 64
End: 2024-10-23

## 2024-12-16 ENCOUNTER — NON-APPOINTMENT (OUTPATIENT)
Age: 64
End: 2024-12-16

## 2024-12-18 ENCOUNTER — APPOINTMENT (OUTPATIENT)
Dept: OBGYN | Facility: CLINIC | Age: 64
End: 2024-12-18

## 2025-01-24 ENCOUNTER — OUTPATIENT (OUTPATIENT)
Dept: OUTPATIENT SERVICES | Facility: HOSPITAL | Age: 65
LOS: 1 days | End: 2025-01-24
Payer: COMMERCIAL

## 2025-01-24 ENCOUNTER — APPOINTMENT (OUTPATIENT)
Dept: GASTROENTEROLOGY | Facility: CLINIC | Age: 65
End: 2025-01-24
Payer: COMMERCIAL

## 2025-01-24 VITALS
DIASTOLIC BLOOD PRESSURE: 79 MMHG | TEMPERATURE: 96.8 F | OXYGEN SATURATION: 95 % | HEIGHT: 67 IN | BODY MASS INDEX: 18.52 KG/M2 | WEIGHT: 118 LBS | SYSTOLIC BLOOD PRESSURE: 127 MMHG | HEART RATE: 66 BPM

## 2025-01-24 DIAGNOSIS — Z12.11 ENCOUNTER FOR SCREENING FOR MALIGNANT NEOPLASM OF COLON: ICD-10-CM

## 2025-01-24 DIAGNOSIS — A63.0 ANOGENITAL (VENEREAL) WARTS: ICD-10-CM

## 2025-01-24 DIAGNOSIS — Z78.9 OTHER SPECIFIED HEALTH STATUS: ICD-10-CM

## 2025-01-24 DIAGNOSIS — Z00.00 ENCOUNTER FOR GENERAL ADULT MEDICAL EXAMINATION WITHOUT ABNORMAL FINDINGS: ICD-10-CM

## 2025-01-24 DIAGNOSIS — R63.4 ABNORMAL WEIGHT LOSS: ICD-10-CM

## 2025-01-24 DIAGNOSIS — R74.8 ABNORMAL LEVELS OF OTHER SERUM ENZYMES: ICD-10-CM

## 2025-01-24 DIAGNOSIS — Z98.890 OTHER SPECIFIED POSTPROCEDURAL STATES: Chronic | ICD-10-CM

## 2025-01-24 DIAGNOSIS — D64.9 ANEMIA, UNSPECIFIED: ICD-10-CM

## 2025-01-24 DIAGNOSIS — Z12.12 ENCOUNTER FOR SCREENING FOR MALIGNANT NEOPLASM OF COLON: ICD-10-CM

## 2025-01-24 PROCEDURE — 99204 OFFICE O/P NEW MOD 45 MIN: CPT

## 2025-01-24 RX ORDER — POLYETHYLENE GLYCOL 3350 AND ELECTROLYTES WITH LEMON FLAVOR 236; 22.74; 6.74; 5.86; 2.97 G/4L; G/4L; G/4L; G/4L; G/4L
236 POWDER, FOR SOLUTION ORAL
Qty: 1 | Refills: 0 | Status: ACTIVE | COMMUNITY
Start: 2025-01-24 | End: 1900-01-01

## 2025-01-30 DIAGNOSIS — Z12.11 ENCOUNTER FOR SCREENING FOR MALIGNANT NEOPLASM OF COLON: ICD-10-CM

## 2025-01-30 DIAGNOSIS — Z78.9 OTHER SPECIFIED HEALTH STATUS: ICD-10-CM

## 2025-01-30 DIAGNOSIS — R63.4 ABNORMAL WEIGHT LOSS: ICD-10-CM

## 2025-01-30 DIAGNOSIS — R74.8 ABNORMAL LEVELS OF OTHER SERUM ENZYMES: ICD-10-CM

## 2025-01-30 DIAGNOSIS — D64.9 ANEMIA, UNSPECIFIED: ICD-10-CM

## 2025-03-26 ENCOUNTER — LABORATORY RESULT (OUTPATIENT)
Age: 65
End: 2025-03-26

## 2025-03-26 ENCOUNTER — APPOINTMENT (OUTPATIENT)
Dept: OBGYN | Facility: CLINIC | Age: 65
End: 2025-03-26
Payer: COMMERCIAL

## 2025-03-26 ENCOUNTER — OUTPATIENT (OUTPATIENT)
Dept: OUTPATIENT SERVICES | Facility: HOSPITAL | Age: 65
LOS: 1 days | End: 2025-03-26
Payer: COMMERCIAL

## 2025-03-26 VITALS — BODY MASS INDEX: 18.79 KG/M2 | WEIGHT: 120 LBS | SYSTOLIC BLOOD PRESSURE: 134 MMHG | DIASTOLIC BLOOD PRESSURE: 79 MMHG

## 2025-03-26 DIAGNOSIS — R87.810 CERVICAL HIGH RISK HUMAN PAPILLOMAVIRUS (HPV) DNA TEST POSITIVE: ICD-10-CM

## 2025-03-26 DIAGNOSIS — Z98.890 OTHER SPECIFIED POSTPROCEDURAL STATES: Chronic | ICD-10-CM

## 2025-03-26 DIAGNOSIS — Z86.19 PERSONAL HISTORY OF OTHER INFECTIOUS AND PARASITIC DISEASES: ICD-10-CM

## 2025-03-26 PROCEDURE — 99459 PELVIC EXAMINATION: CPT

## 2025-03-26 PROCEDURE — 88141 CYTOPATH C/V INTERPRET: CPT

## 2025-03-26 PROCEDURE — 88142 CYTOPATH C/V THIN LAYER: CPT

## 2025-03-26 PROCEDURE — 87625 HPV TYPES 16 & 18 ONLY: CPT

## 2025-03-26 PROCEDURE — 88305 TISSUE EXAM BY PATHOLOGIST: CPT

## 2025-03-26 PROCEDURE — 57454 BX/CURETT OF CERVIX W/SCOPE: CPT

## 2025-03-26 PROCEDURE — 99204 OFFICE O/P NEW MOD 45 MIN: CPT | Mod: 25

## 2025-03-26 PROCEDURE — 88305 TISSUE EXAM BY PATHOLOGIST: CPT | Mod: 26

## 2025-03-26 PROCEDURE — 87624 HPV HI-RISK TYP POOLED RSLT: CPT

## 2025-03-27 ENCOUNTER — OUTPATIENT (OUTPATIENT)
Dept: OUTPATIENT SERVICES | Facility: HOSPITAL | Age: 65
LOS: 1 days | End: 2025-03-27

## 2025-03-27 DIAGNOSIS — N87.1 MODERATE CERVICAL DYSPLASIA: ICD-10-CM

## 2025-03-27 DIAGNOSIS — Z98.890 OTHER SPECIFIED POSTPROCEDURAL STATES: Chronic | ICD-10-CM

## 2025-03-27 DIAGNOSIS — Z86.19 PERSONAL HISTORY OF OTHER INFECTIOUS AND PARASITIC DISEASES: ICD-10-CM

## 2025-03-28 DIAGNOSIS — N87.1 MODERATE CERVICAL DYSPLASIA: ICD-10-CM

## 2025-03-28 LAB
CORE LAB BIOPSY: NORMAL
HPV HIGH+LOW RISK DNA PNL CVX: DETECTED

## 2025-04-06 LAB — CYTOLOGY CVX/VAG DOC THIN PREP: ABNORMAL

## 2025-04-17 ENCOUNTER — APPOINTMENT (OUTPATIENT)
Dept: OBGYN | Facility: CLINIC | Age: 65
End: 2025-04-17

## 2025-05-29 ENCOUNTER — OUTPATIENT (OUTPATIENT)
Dept: OUTPATIENT SERVICES | Facility: HOSPITAL | Age: 65
LOS: 1 days | End: 2025-05-29

## 2025-05-29 ENCOUNTER — APPOINTMENT (OUTPATIENT)
Dept: OBGYN | Facility: CLINIC | Age: 65
End: 2025-05-29

## 2025-05-29 DIAGNOSIS — Z00.00 ENCOUNTER FOR GENERAL ADULT MEDICAL EXAMINATION WITHOUT ABNORMAL FINDINGS: ICD-10-CM

## 2025-05-29 DIAGNOSIS — B97.7 PAPILLOMAVIRUS AS THE CAUSE OF DISEASES CLASSIFIED ELSEWHERE: ICD-10-CM

## 2025-05-29 DIAGNOSIS — Z98.890 OTHER SPECIFIED POSTPROCEDURAL STATES: Chronic | ICD-10-CM

## 2025-05-29 PROCEDURE — 99212 OFFICE O/P EST SF 10 MIN: CPT | Mod: 93

## 2025-06-19 ENCOUNTER — NON-APPOINTMENT (OUTPATIENT)
Age: 65
End: 2025-06-19

## 2025-08-21 ENCOUNTER — NON-APPOINTMENT (OUTPATIENT)
Age: 65
End: 2025-08-21

## 2025-09-02 ENCOUNTER — NON-APPOINTMENT (OUTPATIENT)
Age: 65
End: 2025-09-02